# Patient Record
Sex: FEMALE | Race: WHITE | NOT HISPANIC OR LATINO | Employment: OTHER | URBAN - NONMETROPOLITAN AREA
[De-identification: names, ages, dates, MRNs, and addresses within clinical notes are randomized per-mention and may not be internally consistent; named-entity substitution may affect disease eponyms.]

---

## 2017-03-10 ENCOUNTER — TELEPHONE (OUTPATIENT)
Dept: FAMILY MEDICINE | Facility: OTHER | Age: 62
End: 2017-03-10

## 2017-03-10 NOTE — TELEPHONE ENCOUNTER
Panel Management Review      Patient has the following on her problem list: None      Composite cancer screening  Chart review shows that this patient is due/due soon for the following Mammogram  Summary:    Patient is due/failing the following:   MAMMOGRAM    Action needed:   Patient needs office visit for mammogram.    Type of outreach:    Sent Cerevo message.    Questions for provider review:    None                                                                                                                                    Brigette ACUNA       Chart routed to Care Team .

## 2017-05-19 ENCOUNTER — HOSPITAL ENCOUNTER (OUTPATIENT)
Dept: MAMMOGRAPHY | Facility: CLINIC | Age: 62
Discharge: HOME | End: 2017-05-19
Attending: INTERNAL MEDICINE | Admitting: INTERNAL MEDICINE
Payer: COMMERCIAL

## 2017-05-19 DIAGNOSIS — Z12.31 VISIT FOR SCREENING MAMMOGRAM: ICD-10-CM

## 2017-05-19 PROCEDURE — G0202 SCR MAMMO BI INCL CAD: HCPCS

## 2017-05-22 NOTE — PROGRESS NOTES
Dear Sally, your recent test results are attached.  Your mammogram was normal.    Feel free to contact me via the office or My Chart if you have any questions regarding the above.  Sincerely,  Hugo Carroll DO FACOI

## 2017-07-12 DIAGNOSIS — G25.81 RESTLESS LEG SYNDROME: ICD-10-CM

## 2017-07-12 NOTE — TELEPHONE ENCOUNTER
carbidopa-levodopa (SINEMET CR)  MG per tablet     Last Written Prescription Date: 8/11/16  Last Fill Quantity: 90, # refills: 3  Last Office Visit with [facility]NAILA, [facility] or [facility] prescribing provider: 6/14/16        BP Readings from Last 3 Encounters:   06/14/16 128/80   09/11/15 140/82   06/24/15 136/82

## 2017-07-14 RX ORDER — CARBIDOPA AND LEVODOPA 25; 100 MG/1; MG/1
TABLET, EXTENDED RELEASE ORAL
Qty: 30 TABLET | Refills: 0 | Status: SHIPPED | OUTPATIENT
Start: 2017-07-14 | End: 2017-07-25

## 2017-07-14 NOTE — TELEPHONE ENCOUNTER
Sinemet  Medication is being filled for 1 time refill only due to:  Patient needs to be seen because it has been more than one year since last visit.--routing to schedulers    Tu Waters RN, BSN

## 2017-07-25 ENCOUNTER — OFFICE VISIT (OUTPATIENT)
Dept: FAMILY MEDICINE | Facility: OTHER | Age: 62
End: 2017-07-25
Payer: COMMERCIAL

## 2017-07-25 VITALS
RESPIRATION RATE: 16 BRPM | HEART RATE: 88 BPM | BODY MASS INDEX: 20.73 KG/M2 | SYSTOLIC BLOOD PRESSURE: 120 MMHG | TEMPERATURE: 98 F | WEIGHT: 117 LBS | HEIGHT: 63 IN | DIASTOLIC BLOOD PRESSURE: 78 MMHG

## 2017-07-25 DIAGNOSIS — G25.81 RESTLESS LEG SYNDROME: ICD-10-CM

## 2017-07-25 PROCEDURE — 99213 OFFICE O/P EST LOW 20 MIN: CPT | Performed by: INTERNAL MEDICINE

## 2017-07-25 RX ORDER — CARBIDOPA AND LEVODOPA 25; 100 MG/1; MG/1
TABLET, EXTENDED RELEASE ORAL
Qty: 90 TABLET | Refills: 1 | Status: SHIPPED | OUTPATIENT
Start: 2017-07-25 | End: 2018-02-02

## 2017-07-25 ASSESSMENT — PAIN SCALES - GENERAL: PAINLEVEL: NO PAIN (0)

## 2017-07-25 NOTE — MR AVS SNAPSHOT
"              After Visit Summary   7/25/2017    Sally Ware    MRN: [id number]           Patient Information     Date Of Birth          1955        Visit Information        Provider Department      7/25/2017 9:40 AM Hugo Carroll, DO [facility]        Today's Diagnoses     Restless leg syndrome           Follow-ups after your visit        Who to contact     If you have questions or need follow up information about today's clinic visit or your schedule please contact [facility] directly at [phone].  Normal or non-critical lab and imaging results will be communicated to you by Tintrihart, letter or phone within 4 business days after the clinic has received the results. If you do not hear from us within 7 days, please contact the clinic through HealthTeacher / GoNoodlet or phone. If you have a critical or abnormal lab result, we will notify you by phone as soon as possible.  Submit refill requests through "[employer]" or call your pharmacy and they will forward the refill request to us. Please allow 3 business days for your refill to be completed.          Additional Information About Your Visit        MyChart Information     "[employer]" gives you secure access to your electronic health record. If you see a primary care provider, you can also send messages to your care team and make appointments. If you have questions, please call your primary care clinic.  If you do not have a primary care provider, please call [phone] and they will assist you.        Care EveryWhere ID     This is your Care EveryWhere ID. This could be used by other organizations to access your [place] medical records  [id number]        Your Vitals Were     Pulse Temperature Respirations Height BMI (Body Mass Index)       88 98  F (36.7  C) (Temporal) 16 5' 3\" (1.6 m) 20.73 kg/m2        Blood Pressure from Last 3 Encounters:   07/25/17 120/78   06/14/16 128/80   09/11/15 140/82    Weight from Last 3 Encounters: "   07/25/17 117 lb (53.1 kg)   06/14/16 117 lb 12.8 oz (53.4 kg)   09/11/15 118 lb (53.5 kg)              Today, you had the following     No orders found for display         Today's Medication Changes          These changes are accurate as of: 7/25/17  7:24 PM.  If you have any questions, ask your nurse or doctor.               These medicines have changed or have updated prescriptions.        Dose/Directions    carbidopa-levodopa  MG per CR tablet   Commonly known as:  SINEMET CR   This may have changed:  See the new instructions.   Used for:  Restless leg syndrome   Changed by:  Hugo Carroll, DO        TAKE ONE TABLET BY MOUTH EVERY NIGHT AT BEDTIME   Quantity:  90 tablet   Refills:  1            Where to get your medicines      These medications were sent to [facility] [address]   [address] [place] [address] [facility] [address]     Phone:  [phone]     carbidopa-levodopa  MG per CR tablet                Primary Care Provider Office Phone # Fax #    Hugo Carroll [phone]       [address]   [facility] [address]        Equal Access to Services     GEORGE CLARK AH: Hadii amparo ba hadasho Soomaali, waaxda luqadaha, qaybta kaalmada adeegyada, dhara marroquin. So [facility] [phone].    ATENCIÓN: Si habla español, tiene a richardson disposición servicios gratuitos de asistencia lingüística. [facility] [phone].    We comply with applicable federal civil rights laws and [place] laws. We do not discriminate on the basis of race, color, national origin, age, disability sex, sexual orientation or gender identity.            Thank you!     Thank you for choosing [facility]  for your care. Our goal is always to provide you with excellent care. Hearing back from our patients is one way we can continue to improve our services. Please take a few minutes to complete the written survey  that you may receive in the mail after your visit with us. Thank you!             Your Updated Medication List - Protect others around you: Learn how to safely use, store and throw away your medicines at www.disposemymeds.org.          This list is accurate as of: 7/25/17  7:24 PM.  Always use your most recent med list.                   Brand Name Dispense Instructions for use Diagnosis    augmented betamethasone dipropionate 0.05 % cream    DIPROLENE-AF    50 g    Apply sparingly to affected area twice daily as needed.  Do not apply to face.    Atopic dermatitis, unspecified type       carbidopa-levodopa  MG per CR tablet    SINEMET CR    90 tablet    TAKE ONE TABLET BY MOUTH EVERY NIGHT AT BEDTIME    Restless leg syndrome

## 2017-07-25 NOTE — NURSING NOTE
"Chief Complaint   Patient presents with     Restless Leg     Med check       Initial /78  Pulse 88  Temp 98  F (36.7  C) (Temporal)  Resp 16  Ht 5' 3\" (1.6 m)  Wt 117 lb (53.1 kg)  BMI 20.73 kg/m2 Estimated body mass index is 20.73 kg/(m^2) as calculated from the following:    Height as of this encounter: 5' 3\" (1.6 m).    Weight as of this encounter: 117 lb (53.1 kg).  Medication Reconciliation: complete   Migdalia Le CMA (AAMA)   "

## 2017-07-25 NOTE — PROGRESS NOTES
SUBJECTIVE:                                                    Sally Ware is a 61 year old female who presents to clinic today for the following health issues:      Medication Followup of Simemet CR    Taking Medication as prescribed: yes    Side Effects:  None    Medication Helping Symptoms:  yes                     Chief Complaint           The patient is a pleasant 61-year-old female who has a previous diagnosis of restless leg syndrome.  She was started on Sinemet and has had remarkable improvement.  She notes that at one point she was almost unable to drive the car as she cannot control her feet to use the pedals.  This has resolved.  On occasion she does take two at bedtime but this is generally a rarity.  She denies any symptoms or side effects other than some dry mouth.  She denies any specific extrapyramidal symptoms.                         PAST, FAMILY,SOCIAL HISTORY:     Medical  History:   has a past medical history of Restless legs syndrome.     Surgical History:   has a past surgical history that includes no history of surgery and Colonoscopy (N/A, 9/5/2014).     Social History:   reports that she has been smoking Cigarettes.  She has a 20.00 pack-year smoking history. She has never used smokeless tobacco. She reports that she drinks about 8.4 oz of alcohol per week  She reports that she does not use illicit drugs.     Family History:  family history includes Alzheimer Disease in her mother; Arthritis in her father and mother; C.A.D. in her father; CANCER in her maternal grandfather; HEART DISEASE in her father; Hypertension in her mother.            MEDICATIONS  Current Outpatient Prescriptions   Medication Sig Dispense Refill     carbidopa-levodopa (SINEMET CR)  MG per CR tablet TAKE ONE TABLET BY MOUTH EVERY NIGHT AT BEDTIME 90 tablet 1     [DISCONTINUED] carbidopa-levodopa (SINEMET CR)  MG per CR tablet TAKE ONE TABLET BY MOUTH EVERY NIGHT AT BEDTIME 30 tablet 0      augmented betamethasone dipropionate (DIPROLENE-AF) 0.05 % cream Apply sparingly to affected area twice daily as needed.  Do not apply to face. 50 g 0     [DISCONTINUED] carbidopa-levodopa (SINEMET CR)  MG per tablet TAKE ONE TABLET BY MOUTH EVERY NIGHT AT BEDTIME 90 tablet 3         --------------------------------------------------------------------------------------------------------------------                  Review of Systems     LUNGS: Pt denies: cough,excess sputum, hemoptysis, or shortness of breath.   HEART: Pt denies: chest pain, arrythmia, syncope, tachy or bradyarrhythmia.   GI: Pt denies: nausea, vomitting, diarrhea, constipation, melena, or hematochezia.   NEURO: Pt denies: seizures, strokes, diplopia, weakness, paraesthesias, or paralysis.   SKIN: Pt denies: itching, rashes, discoloration, or specific lesions of concern. Denies recent hair loss.                        Examination       Vital Signs:  B/P: 120/78, T: 98, P: 88, R: 16, BMI: Body mass index is 20.73 kg/(m^2).   Constitutional: The patient appears to be in no acute distress. The patient appears to be adequately hydrated. No acute respiratory or hemodynamic distress is noted at this time.   LUNGS: clear bilaterally, airflow is brisk, no intercostal retraction or stridor is noted. No coughing is noted during visit.   HEART:  regular without rubs, clicks, gallops, or murmurs. PMI is nondisplaced. Upstrokes are brisk. S1,S2 are heard.   GI: Abdomen is soft, without rebound, guarding or tenderness. Bowel sounds are appropriate. No renal bruits are heard.    NEURO: Pt is alert and appropriate. No neurologic lateralization is noted. Cranial nerves 2-12 are intact. Peripheral sensory and motor function are grossly normal.                        Decision-Making          1. Restless leg syndrome  Continue current medication  - carbidopa-levodopa (SINEMET CR)  MG per CR tablet; TAKE ONE TABLET BY MOUTH EVERY NIGHT AT BEDTIME  Dispense:  90 tablet; Refill: 1          I have carefully explained the diagnosis and treatment options with the patient. The patient has displayed an understanding of the above, and all subsequent questions were answered.             DO RAYMOND Stewart    Portions of this note were produced using ii4b  Although every attempt at real-time proof reading has been made, occasional grammar/syntax errors may have been missed.

## 2017-08-09 DIAGNOSIS — G25.81 RESTLESS LEG SYNDROME: ICD-10-CM

## 2017-08-09 NOTE — TELEPHONE ENCOUNTER
Sinemet     Last Written Prescription Date: 7/25/2017  Last Fill Quantity: 90, # refills: 1  Last Office Visit with [facility], [facility] or [facility] prescribing provider: 7/25/2017        BP Readings from Last 3 Encounters:   07/25/17 120/78   06/14/16 128/80   09/11/15 140/82

## 2017-08-11 RX ORDER — CARBIDOPA AND LEVODOPA 25; 100 MG/1; MG/1
1 TABLET, EXTENDED RELEASE ORAL AT BEDTIME
Qty: 30 TABLET | Refills: 0 | OUTPATIENT
Start: 2017-08-11

## 2017-10-11 ENCOUNTER — ALLIED HEALTH/NURSE VISIT (OUTPATIENT)
Dept: FAMILY MEDICINE | Facility: CLINIC | Age: 62
End: 2017-10-11
Payer: COMMERCIAL

## 2017-10-11 DIAGNOSIS — Z23 NEED FOR PROPHYLACTIC VACCINATION AND INOCULATION AGAINST INFLUENZA: Primary | ICD-10-CM

## 2017-10-11 PROCEDURE — 90471 IMMUNIZATION ADMIN: CPT

## 2017-10-11 PROCEDURE — 90686 IIV4 VACC NO PRSV 0.5 ML IM: CPT

## 2017-10-11 NOTE — PROGRESS NOTES
Injectable Influenza Immunization Documentation    1.  Is the person to be vaccinated sick today?   No    2. Does the person to be vaccinated have an allergy to a component   of the vaccine?   No    3. Has the person to be vaccinated ever had a serious reaction   to influenza vaccine in the past?   No    4. Has the person to be vaccinated ever had Guillain-Barré syndrome?   No    Form completed by Gracia Munoz CMA ([facility])

## 2017-10-11 NOTE — NURSING NOTE
Prior to injection verified patient identity using patient's name and date of birth.  Injection of influenza given by Gracia Munoz. Patient instructed to remain in clinic for 15 minutes afterwards, and to report any adverse reaction to me immediately.

## 2017-10-11 NOTE — MR AVS SNAPSHOT
After Visit Summary   10/11/2017    Sally Ware    MRN: [id number]           Patient Information     Date Of Birth          1955        Visit Information        Provider Department      10/11/2017 11:00 AM NL FLOAT TEAM D [facility]        Today's Diagnoses     Need for prophylactic vaccination and inoculation against influenza    -  1       Follow-ups after your visit        Who to contact     If you have questions or need follow up information about today's clinic visit or your schedule please contact [facility] directly at [phone].  Normal or non-critical lab and imaging results will be communicated to you by Car Clubshart, letter or phone within 4 business days after the clinic has received the results. If you do not hear from us within 7 days, please contact the clinic through Justice[employer] or phone. If you have a critical or abnormal lab result, we will notify you by phone as soon as possible.  Submit refill requests through [employer] or call your pharmacy and they will forward the refill request to us. Please allow 3 business days for your refill to be completed.          Additional Information About Your Visit        MyChart Information     [employer] gives you secure access to your electronic health record. If you see a primary care provider, you can also send messages to your care team and make appointments. If you have questions, please call your primary care clinic.  If you do not have a primary care provider, please call [phone] and they will assist you.        Care EveryWhere ID     This is your Care EveryWhere ID. This could be used by other organizations to access your [place] medical records  [id number]         Blood Pressure from Last 3 Encounters:   07/25/17 120/78   06/14/16 128/80   09/11/15 140/82    Weight from Last 3 Encounters:   07/25/17 117 lb (53.1 kg)   06/14/16 117 lb 12.8 oz (53.4 kg)   09/11/15 118 lb (53.5 kg)               We Performed the Following     FLU VAC, SPLIT VIRUS IM > 3 YO (QUADRIVALENT) [59213]     Vaccine Administration, Initial [95933]        Primary Care Provider Office Phone # Fax #    Hugo Carroll [phone] [address] [place] [address]        Equal Access to Services     ANGEL LUIS CLARK : Hadii aad ku hadasho Soomaali, waaxda luqadaha, qaybta kaalmada adeegyada, waxay idiin hayaan adelexx kharash lamirandan . So [facility] [phone].    ATENCIÓN: Si habla español, tiene a richardson disposición servicios gratuitos de asistencia lingüística. Katrinaame al [phone].    We comply with applicable federal civil rights laws and [place] laws. We do not discriminate on the basis of race, color, national origin, age, disability, sex, sexual orientation, or gender identity.            Thank you!     Thank you for choosing [facility]  for your care. Our goal is always to provide you with excellent care. Hearing back from our patients is one way we can continue to improve our services. Please take a few minutes to complete the written survey that you may receive in the mail after your visit with us. Thank you!             Your Updated Medication List - Protect others around you: Learn how to safely use, store and throw away your medicines at www.disposemymeds.org.          This list is accurate as of: 10/11/17 11:18 AM.  Always use your most recent med list.                   Brand Name Dispense Instructions for use Diagnosis    augmented betamethasone dipropionate 0.05 % cream    DIPROLENE-AF    50 g    Apply sparingly to affected area twice daily as needed.  Do not apply to face.    Atopic dermatitis, unspecified type       carbidopa-levodopa  MG per CR tablet    SINEMET CR    90 tablet    TAKE ONE TABLET BY MOUTH EVERY NIGHT AT BEDTIME    Restless leg syndrome

## 2017-12-03 ENCOUNTER — HEALTH MAINTENANCE LETTER (OUTPATIENT)
Age: 62
End: 2017-12-03

## 2018-02-02 DIAGNOSIS — G25.81 RESTLESS LEG SYNDROME: ICD-10-CM

## 2018-02-02 RX ORDER — CARBIDOPA AND LEVODOPA 25; 100 MG/1; MG/1
TABLET, EXTENDED RELEASE ORAL
Qty: 90 TABLET | Refills: 1 | Status: SHIPPED | OUTPATIENT
Start: 2018-02-02 | End: 2018-07-23

## 2018-02-02 NOTE — TELEPHONE ENCOUNTER
"Last Written Prescription Date:  7/25/2017  Last Fill Quantity: 90,  # refills: 1   Last Office Visit with [facility] provider:  7/25/2017   Future Office Visit:     Requested Prescriptions   Pending Prescriptions Disp Refills     carbidopa-levodopa (SINEMET CR)  MG per CR tablet [Pharmacy Med Name: CARBIDOPA-LEVODOPA ER  TBCR] 90 tablet 1     Sig: TAKE ONE TABLET BY MOUTH EVERY NIGHT AT BEDTIME    Antiparkinson's Agents Protocol Failed    2/2/2018 10:25 AM       Failed - CBC on record in past 12 months    Recent Labs   Lab Test  06/12/15   1510   WBC  7.5   RBC  4.37   HGB  14.2   HCT  42.8   PLT  255            Failed - ALT on record in past 12 months        Recent Labs   Lab Test  06/12/15   1510   ALT  16            Failed - Serum Creatinine on file in past 12 months    Recent Labs   Lab Test  06/12/15   1510   CR  0.58            Passed - Blood pressure under 140/90    BP Readings from Last 3 Encounters:   07/25/17 120/78   06/14/16 128/80   09/11/15 140/82                Passed - Recent or future visit with authorizing provider's specialty    Patient had office visit in the last year or has a visit in the next 30 days with authorizing provider.  See \"Patient Info\" tab in inbasket, or \"Choose Columns\" in Meds & Orders section of the refill encounter.            Passed - Patient is age 18 or older       Passed - No active pregnancy on record       Passed - No positive pregnancy test in the past 12 months          "

## 2018-02-02 NOTE — TELEPHONE ENCOUNTER
Routing refill request to provider for review/approval because:  Labs not current:  CBC, ALT, Creatinine    Suyapa Vásquez, RN  [facility]

## 2018-03-26 ENCOUNTER — OFFICE VISIT (OUTPATIENT)
Dept: FAMILY MEDICINE | Facility: OTHER | Age: 63
End: 2018-03-26
Payer: COMMERCIAL

## 2018-03-26 VITALS
OXYGEN SATURATION: 98 % | DIASTOLIC BLOOD PRESSURE: 88 MMHG | HEIGHT: 63 IN | HEART RATE: 80 BPM | WEIGHT: 105.8 LBS | SYSTOLIC BLOOD PRESSURE: 162 MMHG | TEMPERATURE: 97.5 F | BODY MASS INDEX: 18.75 KG/M2

## 2018-03-26 DIAGNOSIS — L98.9 SKIN LESION: Primary | ICD-10-CM

## 2018-03-26 PROCEDURE — 99213 OFFICE O/P EST LOW 20 MIN: CPT | Performed by: INTERNAL MEDICINE

## 2018-03-26 NOTE — PROGRESS NOTES
Chief Complaint   Patient presents with     Derm Problem     dark spot on right cheek                    Chief Complaint    The patient is a pleasant 62-year-old female who recently returned from [place].  She has a lesion on her right cheek that was not there about 2 months ago.  She does have a long-standing history of a small brown spot on the same area and this is consistent with a seborrheic keratosis.  At the inferior aspect of this however is a growing, dark, irregularly shaped 1 cm lesion which is somewhat raised.  He does have characteristics consistent with melanoma.  We have discussed this in detail and the patient would like to have it removed in a prompt fashion.  At this time, we decided that based on its location on her face and the potential for wider margins be necessary that I would request general surgery involvement in this.  We will also set up dermatology referral but unfortunately that is generally several months out.                         PAST, FAMILY,SOCIAL HISTORY:     Medical  History:   has a past medical history of Restless legs syndrome.     Surgical History:   has a past surgical history that includes no history of surgery and Colonoscopy (N/A, 9/5/2014).     Social History:   reports that she has been smoking Cigarettes.  She has a 20.00 pack-year smoking history. She has never used smokeless tobacco. She reports that she drinks about 8.4 oz of alcohol per week  She reports that she does not use illicit drugs.     Family History:  family history includes Alzheimer Disease in her mother; Arthritis in her father and mother; C.A.D. in her father; CANCER in her maternal grandfather; HEART DISEASE in her father; Hypertension in her mother.            MEDICATIONS  Current Outpatient Prescriptions   Medication Sig Dispense Refill     carbidopa-levodopa (SINEMET CR)  MG per CR tablet TAKE ONE TABLET BY MOUTH EVERY NIGHT AT BEDTIME 90 tablet 1     augmented betamethasone dipropionate  "(DIPROLENE-AF) 0.05 % cream Apply sparingly to affected area twice daily as needed.  Do not apply to face. 50 g 0         --------------------------------------------------------------------------------------------------------------------                              Review of Systems     LUNGS: Pt denies: cough,excess sputum, hemoptysis, or shortness of breath.   HEART: Pt denies: chest pain, arrythmia, syncope, tachy or bradyarrhythmia.   GI: Pt denies: nausea, vomitting, diarrhea, constipation, melena, or hematochezia.   NEURO: Pt denies: seizures, strokes, diplopia, weakness, paraesthesias, or paralysis.   SKIN: Pt denies: itching, rashes, discoloration, or additional lesions of concern. Denies recent hair loss.                                       Examination  /88 (BP Location: Left arm, Patient Position: Chair, Cuff Size: Adult Regular)  Pulse 80  Temp 97.5  F (36.4  C) (Tympanic)  Ht 5' 2.5\" (1.588 m)  Wt 105 lb 12.8 oz (48 kg)  SpO2 98%  BMI 19.04 kg/m2   HEART:  regular without rubs, clicks, gallops, or murmurs. PMI is nondisplaced. Upstrokes are brisk. S1,S2 are heard.   LUNGS: clear bilaterally, airflow is brisk, no intercostal retraction or stridor is noted. No coughing is noted during visit.   GI: Abdomen is soft, without rebound, guarding or tenderness. Bowel sounds are appropriate. No renal bruits are heard.   SKIN:  warm and dry. No erythema, or rashes are noted. No additional lesions of concern are noted.                                           Decision Making    1. Skin lesion    - GENERAL SURG ADULT REFERRAL  - DERMATOLOGY REFERRAL   Will set of general surgical referral for evaluation and possible removal of the initial lesion and dermatology consultation for follow-up                               FOLLOW UP    I have asked the patient to make an appointment for follow up with me as needed          Hugo Carroll, DO RAYMOND    Portions of this note were produced using Dragon " [employer] 360  Although every attempt at real-time proof reading has been made, occasional grammar/syntax errors may have been missed.

## 2018-03-26 NOTE — MR AVS SNAPSHOT
After Visit Summary   3/26/2018    Sally Ware    MRN: [id number]           Patient Information     Date Of Birth          1955        Visit Information        Provider Department      3/26/2018 1:00 PM Hugo Carroll,  [facility]         Follow-ups after your visit        Your next 10 appointments already scheduled     Mar 29, 2018  2:15 PM CDT   [facility] Visit with Jose Padilla,    [facility]    [address] [facility] [address] [phone]              Who to contact     If you have questions or need follow up information about today's clinic visit or your schedule please contact [facility] directly at [phone].  Normal or non-critical lab and imaging results will be communicated to you by MyChart, letter or phone within 4 business days after the clinic has received the results. If you do not hear from us within 7 days, please contact the clinic through MyChart or phone. If you have a critical or abnormal lab result, we will notify you by phone as soon as possible.  Submit refill requests through [employer] or call your pharmacy and they will forward the refill request to us. Please allow 3 business days for your refill to be completed.          Additional Information About Your Visit        MyChart Information     [employer] gives you secure access to your electronic health record. If you see a primary care provider, you can also send messages to your care team and make appointments. If you have questions, please call your primary care clinic.  If you do not have a primary care provider, please call [phone] and they will assist you.        Care EveryWhere ID     This is your Care EveryWhere ID. This could be used by other organizations to access your [place] medical records  [id number]        Your Vitals Were     Pulse Temperature Height Pulse Oximetry BMI (Body Mass Index)       80  "97.5  F (36.4  C) (Tympanic) 5' 2.5\" (1.588 m) 98% 19.04 kg/m2        Blood Pressure from Last 3 Encounters:   03/26/18 162/88   07/25/17 120/78   06/14/16 128/80    Weight from Last 3 Encounters:   03/26/18 105 lb 12.8 oz (48 kg)   07/25/17 117 lb (53.1 kg)   06/14/16 117 lb 12.8 oz (53.4 kg)              Today, you had the following     No orders found for display       Primary Care Provider Office Phone # Fax #    Hugo [place], [phone] [address] [place] [address]        Equal Access to Services     ANGEL LUIS CLARK : Kellie walsho Soomaali, waaxda luqadaha, qaybta kaalmada adeegyada, dhara davis . [facility] [phone].    ATENCIÓN: Si habla español, tiene a richardson disposición servicios gratuitos de asistencia lingüística. Llame al [phone].    We comply with applicable federal civil rights laws and [place] laws. We do not discriminate on the basis of race, color, national origin, age, disability, sex, sexual orientation, or gender identity.            Thank you!     Thank you for choosing [facility]  for your care. Our goal is always to provide you with excellent care. Hearing back from our patients is one way we can continue to improve our services. Please take a few minutes to complete the written survey that you may receive in the mail after your visit with us. Thank you!             Your Updated Medication List - Protect others around you: Learn how to safely use, store and throw away your medicines at www.disposemymeds.org.          This list is accurate as of 3/26/18  1:26 PM.  Always use your most recent med list.                   Brand Name Dispense Instructions for use Diagnosis    augmented betamethasone dipropionate 0.05 % cream    DIPROLENE-AF    50 g    Apply sparingly to affected area twice daily as needed.  Do not apply to face.    Atopic dermatitis, unspecified type       carbidopa-levodopa  MG " per CR tablet    SINEMET CR    90 tablet    TAKE ONE TABLET BY MOUTH EVERY NIGHT AT BEDTIME    Restless leg syndrome

## 2018-03-26 NOTE — NURSING NOTE
"Chief Complaint   Patient presents with     Derm Problem     dark spot on right cheek       Initial /88 (BP Location: Left arm, Patient Position: Chair, Cuff Size: Adult Regular)  Pulse 80  Temp 97.5  F (36.4  C) (Tympanic)  Ht 5' 2.5\" (1.588 m)  Wt 105 lb 12.8 oz (48 kg)  SpO2 98%  BMI 19.04 kg/m2 Estimated body mass index is 19.04 kg/(m^2) as calculated from the following:    Height as of this encounter: 5' 2.5\" (1.588 m).    Weight as of this encounter: 105 lb 12.8 oz (48 kg).  Medication Reconciliation: complete  Health Maintenance reviewed at today's visit patient asked to schedule/complete:   Cervical Cancer:  Patient agrees to schedule   Brigette ACUNA      "

## 2018-03-29 ENCOUNTER — OFFICE VISIT (OUTPATIENT)
Dept: SURGERY | Facility: OTHER | Age: 63
End: 2018-03-29
Payer: COMMERCIAL

## 2018-03-29 VITALS
SYSTOLIC BLOOD PRESSURE: 126 MMHG | HEIGHT: 63 IN | WEIGHT: 107 LBS | DIASTOLIC BLOOD PRESSURE: 72 MMHG | BODY MASS INDEX: 18.96 KG/M2 | TEMPERATURE: 98.4 F

## 2018-03-29 DIAGNOSIS — L98.9 SKIN LESION OF FACE: Primary | ICD-10-CM

## 2018-03-29 PROCEDURE — 88305 TISSUE EXAM BY PATHOLOGIST: CPT | Performed by: SURGERY

## 2018-03-29 PROCEDURE — 11100 ZZHC BIOPSY SKIN/SUBQ/MUC MEM, SINGLE LESION: CPT | Performed by: SURGERY

## 2018-03-29 PROCEDURE — 99203 OFFICE O/P NEW LOW 30 MIN: CPT | Mod: 25 | Performed by: SURGERY

## 2018-03-29 NOTE — LETTER
3/29/2018         RE: Sally Ware  [address] [facility] [address]        Dear Colleague,    Thank you for referring your patient, Sally Ware, to the [facility]. Please see a copy of my visit note below.    Patient seen in consultation for skin lesion of face by Hugo Carroll    HPI:  Patient is a 62 year old female with 2 month history of Right cheek lesion. She states that she used to work outside and does a lot of fishing so has history of sun exposure. She denies personal history of skin lesions. He father had several skin cancers including melanoma. She states that it itches at times. It has not drained.    Review Of Systems    Skin: as above  Ears/Nose/Throat: negative  Respiratory: No shortness of breath, dyspnea on exertion, cough, or hemoptysis  Cardiovascular: negative  Gastrointestinal: negative  Genitourinary: negative  Musculoskeletal: negative  Neurologic: negative  Hematologic/Lymphatic/Immunologic: negative  Endocrine: negative      Past Medical History:   Diagnosis Date     Restless legs syndrome        Past Surgical History:   Procedure Laterality Date     COLONOSCOPY N/A 9/5/2014    Procedure: COMBINED COLONOSCOPY, SINGLE BIOPSY/POLYPECTOMY BY BIOPSY;  Surgeon: Turner Freitas MD;  Location: [facility]     NO HISTORY OF SURGERY         Family History   Problem Relation Age of Onset     Alzheimer Disease Mother      dementia     Arthritis Mother      Hypertension Mother      C.A.D. Father      HEART DISEASE Father      Arthritis Father      RA     CANCER Maternal Grandfather      Brain Tumor       Social History     Social History     Marital status: Single     Spouse name: N/A     Number of children: N/A     Years of education: N/A     Occupational History     Not on file.     Social History Main Topics     Smoking status: Current Every Day Smoker     Packs/day: 0.50     Years: 40.00     Types: Cigarettes     Smokeless tobacco: Never Used     Alcohol  "use 8.4 oz/week     14 Standard drinks or equivalent per week      Comment: 2 drinks per day     Drug use: No     Sexual activity: Yes     Partners: Male     Other Topics Concern     Not on file     Social History Narrative       Current Outpatient Prescriptions   Medication Sig Dispense Refill     carbidopa-levodopa (SINEMET CR)  MG per CR tablet TAKE ONE TABLET BY MOUTH EVERY NIGHT AT BEDTIME 90 tablet 1     augmented betamethasone dipropionate (DIPROLENE-AF) 0.05 % cream Apply sparingly to affected area twice daily as needed.  Do not apply to face. 50 g 0       Medications and history reviewed    Physical exam:  Vitals: /72  Temp 98.4  F (36.9  C) (Temporal)  Ht 1.588 m (5' 2.5\")  Wt 48.5 kg (107 lb)  BMI 19.26 kg/m2  BMI= Body mass index is 19.26 kg/(m^2).    Constitutional: Healthy, alert, non-distressed   Head: Normo-cephalic, atraumatic, no lesions, masses or tenderness   Cardiovascular: RRR, no new murmurs, +S1, +S2 heart sounds, no clicks, rubs or gallops   Respiratory: CTAB, no rales, rhonchi or wheezing, equal chest rise, good respiratory effort   Gastrointestinal: Soft, non-tender, non distended, no rebound rigidity or guarding, no masses or hernias palpated   : Deferred  Musculoskeletal: Moves all extremities, normal  strength, no deformities noted   Skin: Right cheek with raised, textured pigmented lesion adjacent to lighter pigmented flat area. Size of larger raised area ~7mm diameter  Psychiatric: Mentation appears normal, affect appropriate   Hematologic/Lymphatic/Immunologic: Normal cervical and supraclavicular lymph nodes   Patient able to get up on table without difficulty.    Labs show:  No results found for this or any previous visit (from the past 24 hour(s)).    I    Assessment:     ICD-10-CM    1. Skin lesion of face L98.9      Plan: Punch biopsy of skin lesion of right cheek. If malignant will refer to dermatology, if benign I will excise remainder of lesion in office " with minimal margins. See below for procedure.    Jose Padilla DO     PROCEDURE:   Written consent was obtained    The right cheek area was prepped and appropriately anesthetized with 1% lidocaine with epinephrine. Using the usual technique, 4mm punch biopsy was performed. The total area excised, including lesion and margins was 0.4 cm.  Closure was accomplished with  subcuticular 4-0 monocryl for the skin. Total length of the incision after closure was 0.5 cm. An appropriate  dressing was applied.  The procedure was well tolerated and without complications. Specimen was sent to Pathology.          Again, thank you for allowing me to participate in the care of your patient.        Sincerely,        Joes Padilla DO

## 2018-03-29 NOTE — MR AVS SNAPSHOT
After Visit Summary   3/29/2018    Sally Ware    MRN: [id number]           Patient Information     Date Of Birth          1955        Visit Information        Provider Department      3/29/2018 2:15 PM Jose Padilla DO [facility]        Today's Diagnoses     Skin lesion of face    -  1       Follow-ups after your visit        Your next 10 appointments already scheduled     Apr 05, 2018  1:30 PM CDT   [facility] Visit with Jose Padilla DO   [facility]    [address] [facility] [address] [phone]              Who to contact     If you have questions or need follow up information about today's clinic visit or your schedule please contact [facility] directly at [phone].  Normal or non-critical lab and imaging results will be communicated to you by MyChart, letter or phone within 4 business days after the clinic has received the results. If you do not hear from us within 7 days, please contact the clinic through MyChart or phone. If you have a critical or abnormal lab result, we will notify you by phone as soon as possible.  Submit refill requests through [employer] or call your pharmacy and they will forward the refill request to us. Please allow 3 business days for your refill to be completed.          Additional Information About Your Visit        MyChart Information     [employer] gives you secure access to your electronic health record. If you see a primary care provider, you can also send messages to your care team and make appointments. If you have questions, please call your primary care clinic.  If you do not have a primary care provider, please call [phone] and they will assist you.        Care EveryWhere ID     This is your Care EveryWhere ID. This could be used by other organizations to access your [place] medical records  [id number]        Your Vitals Were     Temperature  "Height BMI (Body Mass Index)             98.4  F (36.9  C) (Temporal) 1.588 m (5' 2.5\") 19.26 kg/m2          Blood Pressure from Last 3 Encounters:   03/29/18 126/72   03/26/18 162/88   07/25/17 120/78    Weight from Last 3 Encounters:   03/29/18 48.5 kg (107 lb)   03/26/18 48 kg (105 lb 12.8 oz)   07/25/17 53.1 kg (117 lb)              We Performed the Following     Surgical pathology exam        Primary Care Provider Office Phone # Fax #    Hugo Carroll, [phone] [address] [place] [address]        Equal Access to Services     GEORGE CLARK : Hadii aad ku hadasho Sonikunjali, waaxda luqadaha, qaybta kaalmada adeegyada, waxay emmanuel marroquin. So [facility] [phone].    ATENCIÓN: Si habla español, tiene a richardson disposición servicios gratuitos de asistencia lingüística. LlGood [facility] [phone].    We comply with applicable federal civil rights laws and [place] laws. We do not discriminate on the basis of race, color, national origin, age, disability, sex, sexual orientation, or gender identity.            Thank you!     Thank you for choosing [facility]  for your care. Our goal is always to provide you with excellent care. Hearing back from our patients is one way we can continue to improve our services. Please take a few minutes to complete the written survey that you may receive in the mail after your visit with us. Thank you!             Your Updated Medication List - Protect others around you: Learn how to safely use, store and throw away your medicines at www.disposemymeds.org.          This list is accurate as of 3/29/18  4:29 PM.  Always use your most recent med list.                   Brand Name Dispense Instructions for use Diagnosis    augmented betamethasone dipropionate 0.05 % cream    DIPROLENE-AF    50 g    Apply sparingly to affected area twice daily as needed.  Do not apply to face.    Atopic dermatitis, unspecified type       " carbidopa-levodopa  MG per CR tablet    SINEMET CR    90 tablet    TAKE ONE TABLET BY MOUTH EVERY NIGHT AT BEDTIME    Restless leg syndrome

## 2018-03-29 NOTE — PROGRESS NOTES
Patient seen in consultation for skin lesion of face by Hugo Carroll    HPI:  Patient is a 62 year old female with 2 month history of Right cheek lesion. She states that she used to work outside and does a lot of fishing so has history of sun exposure. She denies personal history of skin lesions. He father had several skin cancers including melanoma. She states that it itches at times. It has not drained.    Review Of Systems    Skin: as above  Ears/Nose/Throat: negative  Respiratory: No shortness of breath, dyspnea on exertion, cough, or hemoptysis  Cardiovascular: negative  Gastrointestinal: negative  Genitourinary: negative  Musculoskeletal: negative  Neurologic: negative  Hematologic/Lymphatic/Immunologic: negative  Endocrine: negative      Past Medical History:   Diagnosis Date     Restless legs syndrome        Past Surgical History:   Procedure Laterality Date     COLONOSCOPY N/A 9/5/2014    Procedure: COMBINED COLONOSCOPY, SINGLE BIOPSY/POLYPECTOMY BY BIOPSY;  Surgeon: Turner Freitas MD;  Location: [facility]     NO HISTORY OF SURGERY         Family History   Problem Relation Age of Onset     Alzheimer Disease Mother      dementia     Arthritis Mother      Hypertension Mother      C.A.D. Father      HEART DISEASE Father      Arthritis Father      RA     CANCER Maternal Grandfather      Brain Tumor       Social History     Social History     Marital status: Single     Spouse name: N/A     Number of children: N/A     Years of education: N/A     Occupational History     Not on file.     Social History Main Topics     Smoking status: Current Every Day Smoker     Packs/day: 0.50     Years: 40.00     Types: Cigarettes     Smokeless tobacco: Never Used     Alcohol use 8.4 oz/week     14 Standard drinks or equivalent per week      Comment: 2 drinks per day     Drug use: No     Sexual activity: Yes     Partners: Male     Other Topics Concern     Not on file     Social History Narrative       Current  "Outpatient Prescriptions   Medication Sig Dispense Refill     carbidopa-levodopa (SINEMET CR)  MG per CR tablet TAKE ONE TABLET BY MOUTH EVERY NIGHT AT BEDTIME 90 tablet 1     augmented betamethasone dipropionate (DIPROLENE-AF) 0.05 % cream Apply sparingly to affected area twice daily as needed.  Do not apply to face. 50 g 0       Medications and history reviewed    Physical exam:  Vitals: /72  Temp 98.4  F (36.9  C) (Temporal)  Ht 1.588 m (5' 2.5\")  Wt 48.5 kg (107 lb)  BMI 19.26 kg/m2  BMI= Body mass index is 19.26 kg/(m^2).    Constitutional: Healthy, alert, non-distressed   Head: Normo-cephalic, atraumatic, no lesions, masses or tenderness   Cardiovascular: RRR, no new murmurs, +S1, +S2 heart sounds, no clicks, rubs or gallops   Respiratory: CTAB, no rales, rhonchi or wheezing, equal chest rise, good respiratory effort   Gastrointestinal: Soft, non-tender, non distended, no rebound rigidity or guarding, no masses or hernias palpated   : Deferred  Musculoskeletal: Moves all extremities, normal  strength, no deformities noted   Skin: Right cheek with raised, textured pigmented lesion adjacent to lighter pigmented flat area. Size of larger raised area ~7mm diameter  Psychiatric: Mentation appears normal, affect appropriate   Hematologic/Lymphatic/Immunologic: Normal cervical and supraclavicular lymph nodes   Patient able to get up on table without difficulty.    Labs show:  No results found for this or any previous visit (from the past 24 hour(s)).    I    Assessment:     ICD-10-CM    1. Skin lesion of face L98.9      Plan: Punch biopsy of skin lesion of right cheek. If malignant will refer to dermatology, if benign I will excise remainder of lesion in office with minimal margins. See below for procedure.    Jose Padilla,      PROCEDURE:   Written consent was obtained    The right cheek area was prepped and appropriately anesthetized with 1% lidocaine with epinephrine. Using the " usual technique, 4mm punch biopsy was performed. The total area excised, including lesion and margins was 0.4 cm.  Closure was accomplished with  subcuticular 4-0 monocryl for the skin. Total length of the incision after closure was 0.5 cm. An appropriate  dressing was applied.  The procedure was well tolerated and without complications. Specimen was sent to Pathology.

## 2018-03-29 NOTE — NURSING NOTE
"Chief Complaint   Patient presents with     Consult     dark spot, right face       Initial /72  Temp 98.4  F (36.9  C) (Temporal)  Ht 1.588 m (5' 2.5\")  Wt 48.5 kg (107 lb)  BMI 19.26 kg/m2 Estimated body mass index is 19.26 kg/(m^2) as calculated from the following:    Height as of this encounter: 1.588 m (5' 2.5\").    Weight as of this encounter: 48.5 kg (107 lb).  Medication Reconciliation: complete   Curry ODONNELL CMA    "

## 2018-04-02 LAB — COPATH REPORT: NORMAL

## 2018-04-05 ENCOUNTER — OFFICE VISIT (OUTPATIENT)
Dept: SURGERY | Facility: OTHER | Age: 63
End: 2018-04-05
Payer: COMMERCIAL

## 2018-04-05 VITALS
BODY MASS INDEX: 18.78 KG/M2 | HEIGHT: 63 IN | TEMPERATURE: 97.9 F | SYSTOLIC BLOOD PRESSURE: 132 MMHG | DIASTOLIC BLOOD PRESSURE: 84 MMHG | WEIGHT: 106 LBS

## 2018-04-05 DIAGNOSIS — L98.9 BENIGN SKIN LESION OF FACE: Primary | ICD-10-CM

## 2018-04-05 PROCEDURE — 11441 EXC FACE-MM B9+MARG 0.6-1 CM: CPT | Performed by: SURGERY

## 2018-04-05 PROCEDURE — 12051 INTMD RPR FACE/MM 2.5 CM/<: CPT | Performed by: SURGERY

## 2018-04-05 NOTE — MR AVS SNAPSHOT
"              After Visit Summary   4/5/2018    Sally Ware    MRN: [id number]           Patient Information     Date Of Birth          1955        Visit Information        Provider Department      4/5/2018 1:30 PM Jose Padilla,  [facility]        Today's Diagnoses     Benign skin lesion of face    -  1       Follow-ups after your visit        Who to contact     If you have questions or need follow up information about today's clinic visit or your schedule please contact [facility] directly at [phone].  Normal or non-critical lab and imaging results will be communicated to you by [employer], letter or phone within 4 business days after the clinic has received the results. If you do not hear from us within 7 days, please contact the clinic through [employer] or phone. If you have a critical or abnormal lab result, we will notify you by phone as soon as possible.  Submit refill requests through [employer] or call your pharmacy and they will forward the refill request to us. Please allow 3 business days for your refill to be completed.          Additional Information About Your Visit        MyChart Information     [employer] gives you secure access to your electronic health record. If you see a primary care provider, you can also send messages to your care team and make appointments. If you have questions, please call your primary care clinic.  If you do not have a primary care provider, please call [phone] and they will assist you.        Care EveryWhere ID     This is your Care EveryWhere ID. This could be used by other organizations to access your [place] medical records  [id number]        Your Vitals Were     Temperature Height BMI (Body Mass Index)             97.9  F (36.6  C) (Temporal) 1.6 m (5' 3\") 18.78 kg/m2          Blood Pressure from Last 3 Encounters:   04/05/18 132/84   03/29/18 126/72   03/26/18 162/88    Weight from Last 3 Encounters:   04/05/18 " 48.1 kg (106 lb)   03/29/18 48.5 kg (107 lb)   03/26/18 48 kg (105 lb 12.8 oz)              Today, you had the following     No orders found for display       Primary Care Provider Office Phone # Fax #    Hugo Carroll [phone] [address] [place] [address]        Equal Access to Services     [facility] CenterERWIN : Hadii aad ku hadasho Soomaali, waaxda luqadaha, qaybta kaalmada adeegyada, waxay idiin hayaan adeeg kharash la'aan ah. So [facility] [phone].    ATENCIÓN: Si habla mily, tiene a richardson disposición servicios gratuitos de asistencia lingüística. Llame al [phone].    We comply with applicable federal civil rights laws and [place] laws. We do not discriminate on the basis of race, color, national origin, age, disability, sex, sexual orientation, or gender identity.            Thank you!     Thank you for choosing [facility]  for your care. Our goal is always to provide you with excellent care. Hearing back from our patients is one way we can continue to improve our services. Please take a few minutes to complete the written survey that you may receive in the mail after your visit with us. Thank you!             Your Updated Medication List - Protect others around you: Learn how to safely use, store and throw away your medicines at www.disposemymeds.org.          This list is accurate as of 4/5/18  4:47 PM.  Always use your most recent med list.                   Brand Name Dispense Instructions for use Diagnosis    augmented betamethasone dipropionate 0.05 % cream    DIPROLENE-AF    50 g    Apply sparingly to affected area twice daily as needed.  Do not apply to face.    Atopic dermatitis, unspecified type       carbidopa-levodopa  MG per CR tablet    SINEMET CR    90 tablet    TAKE ONE TABLET BY MOUTH EVERY NIGHT AT BEDTIME    Restless leg syndrome

## 2018-04-05 NOTE — LETTER
4/5/2018         RE: Sally Ware  [address]  [place] [address]        Dear Colleague,    Thank you for referring your patient, Sally Ware, to the [facility]. Please see a copy of my visit note below.    Punch biopsy revealed inflamed seborrhoic kerataosis, she would like the remainder of the pigmented lesion removed.      PROCEDURE:   Written consent was obtained    The right cheek area was prepped and appropriately anesthetized with 1% lidocaine with epinephrine. Using the usual technique, excision of benign lesion of the face was performed. The total area excised, including lesion and margins was 1.0cm x 0.3 cm.  Closure was accomplished with interrupted 3-0 vicryl for the dermal layer and running subcuticular 4-0 monocryl for the skin. Total length of the incision after closure was 1.0 cm. An appropriate  dressing was applied.  The procedure was well tolerated and without complications. Specimen was not sent to Pathology.        Again, thank you for allowing me to participate in the care of your patient.        Sincerely,        Jose Padilla, DO

## 2018-04-05 NOTE — NURSING NOTE
"Chief Complaint   Patient presents with     RECHECK     spot on face and results       Initial /84  Temp 97.9  F (36.6  C) (Temporal)  Ht 1.6 m (5' 3\")  Wt 48.1 kg (106 lb)  BMI 18.78 kg/m2 Estimated body mass index is 18.78 kg/(m^2) as calculated from the following:    Height as of this encounter: 1.6 m (5' 3\").    Weight as of this encounter: 48.1 kg (106 lb).  Medication Reconciliation: complete   Curry ODONNELL CMA    "

## 2018-04-05 NOTE — PROGRESS NOTES
Punch biopsy revealed inflamed seborrhoic kerataosis, she would like the remainder of the pigmented lesion removed.      PROCEDURE:   Written consent was obtained    The right cheek area was prepped and appropriately anesthetized with 1% lidocaine with epinephrine. Using the usual technique, excision of benign lesion of the face was performed. The total area excised, including lesion and margins was 1.0cm x 0.3 cm.  Closure was accomplished with interrupted 3-0 vicryl for the dermal layer and running subcuticular 4-0 monocryl for the skin. Total length of the incision after closure was 1.0 cm. An appropriate  dressing was applied.  The procedure was well tolerated and without complications. Specimen was not sent to Pathology.

## 2018-07-09 ENCOUNTER — HOSPITAL ENCOUNTER (OUTPATIENT)
Dept: MAMMOGRAPHY | Facility: CLINIC | Age: 63
Discharge: HOME | End: 2018-07-09
Attending: INTERNAL MEDICINE | Admitting: INTERNAL MEDICINE
Payer: COMMERCIAL

## 2018-07-09 DIAGNOSIS — Z12.31 VISIT FOR SCREENING MAMMOGRAM: ICD-10-CM

## 2018-07-09 PROCEDURE — 77067 SCR MAMMO BI INCL CAD: CPT

## 2018-07-23 DIAGNOSIS — G25.81 RESTLESS LEG SYNDROME: ICD-10-CM

## 2018-07-23 NOTE — TELEPHONE ENCOUNTER
"Requested Prescriptions   Pending Prescriptions Disp Refills     carbidopa-levodopa (SINEMET CR)  MG per CR tablet [Pharmacy Med Name: CARBIDOPA-LEVODOPA ER  TBCR] 90 tablet 1    Last Written Prescription Date:  2/2/18  Last Fill Quantity: 90,  # refills: 1   Last office visit: 3/26/2018 with prescribing provider:  3/26/18   Future Office Visit:     Sig: TAKE ONE TABLET BY MOUTH EVERY NIGHT AT BEDTIME    Antiparkinson's Agents Protocol Passed    7/23/2018 10:29 AM       Passed - Recent (12 mo) or future (30 days) visit within the authorizing provider's specialty    Patient had office visit in the last 12 months or has a visit in the next 30 days with authorizing provider or within the authorizing provider's specialty.  See \"Patient Info\" tab in inbasket, or \"Choose Columns\" in Meds & Orders section of the refill encounter.           Passed - Patient is age 18 or older       Passed - No active pregnancy on record       Passed - No positive pregnancy test in the past 12 months          "

## 2018-07-24 RX ORDER — CARBIDOPA AND LEVODOPA 25; 100 MG/1; MG/1
TABLET, EXTENDED RELEASE ORAL
Qty: 90 TABLET | Refills: 1 | Status: SHIPPED | OUTPATIENT
Start: 2018-07-24 | End: 2019-01-20

## 2018-07-24 NOTE — TELEPHONE ENCOUNTER
Routing refill request to provider for review/approval because:  Has not been seen for this in over a year    Suyapa Vásquez RN  [facility]

## 2018-09-14 ENCOUNTER — OFFICE VISIT (OUTPATIENT)
Dept: FAMILY MEDICINE | Facility: OTHER | Age: 63
End: 2018-09-14
Payer: COMMERCIAL

## 2018-09-14 VITALS
TEMPERATURE: 97.5 F | RESPIRATION RATE: 24 BRPM | SYSTOLIC BLOOD PRESSURE: 152 MMHG | DIASTOLIC BLOOD PRESSURE: 88 MMHG | OXYGEN SATURATION: 97 % | BODY MASS INDEX: 18.19 KG/M2 | WEIGHT: 102.7 LBS | HEART RATE: 88 BPM

## 2018-09-14 DIAGNOSIS — H69.93 DYSFUNCTION OF BOTH EUSTACHIAN TUBES: Primary | ICD-10-CM

## 2018-09-14 PROCEDURE — 99213 OFFICE O/P EST LOW 20 MIN: CPT | Performed by: INTERNAL MEDICINE

## 2018-09-14 RX ORDER — CETIRIZINE HYDROCHLORIDE 10 MG/1
10 TABLET ORAL EVERY EVENING
Qty: 30 TABLET | Refills: 0 | Status: SHIPPED | OUTPATIENT
Start: 2018-09-14 | End: 2018-10-08

## 2018-09-14 RX ORDER — PREDNISONE 20 MG/1
40 TABLET ORAL DAILY
Qty: 10 TABLET | Refills: 0 | Status: SHIPPED | OUTPATIENT
Start: 2018-09-14 | End: 2018-09-19

## 2018-09-14 ASSESSMENT — PAIN SCALES - GENERAL: PAINLEVEL: NO PAIN (0)

## 2018-09-14 NOTE — MR AVS SNAPSHOT
After Visit Summary   9/14/2018    Sally Ware    MRN: [id number]           Patient Information     Date Of Birth          1955        Visit Information        Provider Department      9/14/2018 2:40 PM Hugo Carroll DO [facility]        Today's Diagnoses     Dysfunction of both eustachian tubes    -  1       Follow-ups after your visit        Follow-up notes from your care team     Return in about 2 weeks (around 9/28/2018) for Follow up.      Your next 10 appointments already scheduled     Oct 08, 2018  8:40 AM CDT   [facility]YSICAL with ROCIO Kan CNP   [facility]    [address] [facility] [address] [phone]              Who to contact     If you have questions or need follow up information about today's clinic visit or your schedule please contact [facility] directly at [phone].  Normal or non-critical lab and imaging results will be communicated to you by Flavour[employer]art, letter or phone within 4 business days after the clinic has received the results. If you do not hear from us within 7 days, please contact the clinic through Flavourshart or phone. If you have a critical or abnormal lab result, we will notify you by phone as soon as possible.  Submit refill requests through [employer] or call your pharmacy and they will forward the refill request to us. Please allow 3 business days for your refill to be completed.          Additional Information About Your Visit        MyChart Information     [employer] gives you secure access to your electronic health record. If you see a primary care provider, you can also send messages to your care team and make appointments. If you have questions, please call your primary care clinic.  If you do not have a primary care provider, please call [phone] and they will assist you.        Care EveryWhere ID     This is your Care EveryWhere ID. This could be used by  other organizations to access your Telephone medical records  [id number]        Your Vitals Were     Pulse Temperature Respirations Pulse Oximetry Breastfeeding? BMI (Body Mass Index)    88 97.5  F (36.4  C) (Temporal) 24 97% No 18.19 kg/m2       Blood Pressure from Last 3 Encounters:   09/14/18 152/88   04/05/18 132/84   03/29/18 126/72    Weight from Last 3 Encounters:   09/14/18 102 lb 11.2 oz (46.6 kg)   04/05/18 106 lb (48.1 kg)   03/29/18 107 lb (48.5 kg)              Today, you had the following     No orders found for display         Today's Medication Changes          These changes are accurate as of 9/14/18 11:59 PM.  If you have any questions, ask your nurse or doctor.               Start taking these medicines.        Dose/Directions    cetirizine 10 MG tablet   Commonly known as:  zyrTEC   Used for:  Dysfunction of both eustachian tubes   Started by:  Hugo Carroll DO        Dose:  10 mg   Take 1 tablet (10 mg) by mouth every evening   Quantity:  30 tablet   Refills:  0       predniSONE 20 MG tablet   Commonly known as:  DELTASONE   Used for:  Dysfunction of both eustachian tubes   Started by:  Hugo Carroll DO        Dose:  40 mg   Take 2 tablets (40 mg) by mouth daily for 5 days   Quantity:  10 tablet   Refills:  0            Where to get your medicines      These medications [facility] to [facility] [address]  [address] [facility] [address]     Phone:  [phone]     cetirizine 10 MG tablet    predniSONE 20 MG tablet                Primary Care Provider Office Phone # Fax #    Hugo Carroll [phone]       [address] [place] [address]        Equal Access to Services     GEORGE CLARK AH: Kellie Horta, waaxda luqadaha, qaybta kaalmada adelexxyaharini, dhara marroquin. So [facility] [phone].    ATENCIÓN: Si habla español, tiene a richardson disposición  servicios gratuitos de asistencia lingüística. Mando macario [phone].    We comply with applicable federal civil rights laws and [place] laws. We do not discriminate on the basis of race, color, national origin, age, disability, sex, sexual orientation, or gender identity.            Thank you!     Thank you for choosing [facility]  for your care. Our goal is always to provide you with excellent care. Hearing back from our patients is one way we can continue to improve our services. Please take a few minutes to complete the written survey that you may receive in the mail after your visit with us. Thank you!             Your Updated Medication List - Protect others around you: Learn how to safely use, store and throw away your medicines at www.disposemymeds.org.          This list is accurate as of 9/14/18 11:59 PM.  Always use your most recent med list.                   Brand Name Dispense Instructions for use Diagnosis    augmented betamethasone dipropionate 0.05 % cream    DIPROLENE-AF    50 g    Apply sparingly to affected area twice daily as needed.  Do not apply to face.    Atopic dermatitis, unspecified type       carbidopa-levodopa  MG per CR tablet    SINEMET CR    90 tablet    TAKE ONE TABLET BY MOUTH EVERY NIGHT AT BEDTIME    Restless leg syndrome       cetirizine 10 MG tablet    zyrTEC    30 tablet    Take 1 tablet (10 mg) by mouth every evening    Dysfunction of both eustachian tubes       predniSONE 20 MG tablet    DELTASONE    10 tablet    Take 2 tablets (40 mg) by mouth daily for 5 days    Dysfunction of both eustachian tubes

## 2018-09-14 NOTE — PROGRESS NOTES
Chief Complaint   Patient presents with     Ear Problem     right ear     Health Maintenance reviewed at today's visit patient asked to schedule/complete:   Cervical Cancer:  Patient agrees to schedule    CHIEF COMPLAINT:    The patient is a pleasant 62-year-old female who presents today with right ear pain. She's had it now for several weeks. She's had no fever or chills. She denies any discharge or drainage. She notes that everything sounds muffled, she has a little bit of vertigo, and she has been slightly more tired than usual. She states that this is somewhat dependent upon the weather and the weather changes.                       PAST, FAMILY,SOCIAL HISTORY:     Medical  History:   has a past medical history of Restless legs syndrome.     Surgical History:   has a past surgical history that includes no history of surgery and Colonoscopy (N/A, 9/5/2014).     Social History:   reports that she has been smoking Cigarettes.  She has a 20.00 pack-year smoking history. She has never used smokeless tobacco. She reports that she drinks about 8.4 oz of alcohol per week  She reports that she does not use illicit drugs.     Family History:  family history includes Alzheimer Disease in her mother; Arthritis in her father and mother; C.A.D. in her father; Cancer in her maternal grandfather; HEART DISEASE in her father; Hypertension in her mother.            MEDICATIONS  Current Outpatient Prescriptions   Medication Sig Dispense Refill     augmented betamethasone dipropionate (DIPROLENE-AF) 0.05 % cream Apply sparingly to affected area twice daily as needed.  Do not apply to face. 50 g 0     carbidopa-levodopa (SINEMET CR)  MG per CR tablet TAKE ONE TABLET BY MOUTH EVERY NIGHT AT BEDTIME 90 tablet 1     cetirizine (ZYRTEC) 10 MG tablet Take 1 tablet (10 mg) by mouth every evening 30 tablet 0     predniSONE (DELTASONE) 20 MG tablet Take 2 tablets (40 mg) by mouth daily for 5 days 10 tablet 0          --------------------------------------------------------------------------------------------------------------------                          REVIEW OF SYSTEMS:         LUNGS: Pt denies: cough,excess sputum, hemoptysis, or shortness of breath.   HEART: Pt denies: chest pain, arrythmia, syncope, tachy or bradyarrhythmia or excess edema.   GI: Pt denies: nausea, vomitting, diarrhea, constipation, melena, or hematochezia.   NEURO: Pt denies: seizures, strokes, diplopia, weakness, paraesthesias, or paralysis.   SKIN: Pt denies: itching, rashes, discoloration, or specific lesions of concern. Denies recent hair loss.   PSYCH: The patient denies significant depression, anxiety, mood imbalance. Specifically denies any suicidal ideation.                          EXAMINATION:         /88 (BP Location: Right arm, Patient Position: Chair, Cuff Size: Adult Regular)  Pulse 88  Temp 97.5  F (36.4  C) (Temporal)  Resp 24  Wt 102 lb 11.2 oz (46.6 kg)  SpO2 97%  Breastfeeding? No  BMI 18.19 kg/m2   Constitutional: The patient appears to be in no acute distress. The patient appears to be adequately hydrated. No acute respiratory or hemodynamic distress is noted at this time.   LUNGS: clear bilaterally, airflow is brisk, no intercostal retraction or stridor is noted. No coughing is noted during visit.   HEART:  regular without rubs, clicks, gallops, or murmurs. PMI is nondisplaced. Upstrokes are brisk. S1,S2 are heard.   GI: Abdomen is soft, without rebound, guarding or tenderness. Bowel sounds are appropriate. No renal bruits are heard.    NEURO: Pt is alert and appropriate. No neurologic lateralization is noted. Cranial nerves 2-12 are intact. Peripheral sensory and motor function are grossly normal              ENT: Nasal mucosa is moist, no exudates are seen. No obstruction is present. Pharynx is clear of exudates, no significant cervical adenopathy is present. Tympanic membranes are bilaterally  Retracted with  fluid level noted. No significant erythema is present at this time.                          DECISION MAKIN. Dysfunction of both eustachian tubes  Will place patient on a short course of antihistamine and cortisone. Would avoid decongestant usage.  No signs of infection or indication for antibiotic at this time.  Recommend smoking cessation  - predniSONE (DELTASONE) 20   MG tablet; Take 2 tablets (40 mg) by mouth daily for 5 days  Dispense: 10 tablet; Refill: 0  - cetirizine (ZYRTEC) 10 MG tablet; Take 1 tablet (10 mg) by mouth every evening  Dispense: 30 tablet; Refill: 0                               FOLLOW UP    I have asked the patient to make an appointment for follow up with me in 2 weeks or as needed        I have carefully explained the diagnosis and treatment options with the patient. The patient has displayed an understanding of the above, and all subsequent questions were answered.         DO RAYMOND Stewart    Portions of this note were produced using [employer]ee[employer]AVES  Although every attempt at real-time proof reading has been made, occasional grammar/syntax errors may have been missed.

## 2018-10-08 ENCOUNTER — OFFICE VISIT (OUTPATIENT)
Dept: FAMILY MEDICINE | Facility: OTHER | Age: 63
End: 2018-10-08
Payer: COMMERCIAL

## 2018-10-08 VITALS
TEMPERATURE: 97 F | BODY MASS INDEX: 18.33 KG/M2 | HEART RATE: 94 BPM | OXYGEN SATURATION: 98 % | DIASTOLIC BLOOD PRESSURE: 80 MMHG | SYSTOLIC BLOOD PRESSURE: 130 MMHG | WEIGHT: 99.6 LBS | RESPIRATION RATE: 20 BRPM | HEIGHT: 62 IN

## 2018-10-08 DIAGNOSIS — Z23 NEED FOR PROPHYLACTIC VACCINATION AND INOCULATION AGAINST INFLUENZA: ICD-10-CM

## 2018-10-08 DIAGNOSIS — H69.93 DYSFUNCTION OF BOTH EUSTACHIAN TUBES: ICD-10-CM

## 2018-10-08 DIAGNOSIS — Z72.0 TOBACCO ABUSE: ICD-10-CM

## 2018-10-08 DIAGNOSIS — Z12.4 SCREENING FOR MALIGNANT NEOPLASM OF CERVIX: ICD-10-CM

## 2018-10-08 DIAGNOSIS — Z13.1 SCREENING FOR DIABETES MELLITUS: ICD-10-CM

## 2018-10-08 DIAGNOSIS — Z11.59 NEED FOR HEPATITIS C SCREENING TEST: ICD-10-CM

## 2018-10-08 DIAGNOSIS — Z13.6 CARDIOVASCULAR SCREENING; LDL GOAL LESS THAN 160: ICD-10-CM

## 2018-10-08 DIAGNOSIS — Z71.6 TOBACCO ABUSE COUNSELING: ICD-10-CM

## 2018-10-08 DIAGNOSIS — Z00.00 ROUTINE HISTORY AND PHYSICAL EXAMINATION OF ADULT: Primary | ICD-10-CM

## 2018-10-08 LAB
CHOLEST SERPL-MCNC: 216 MG/DL
GLUCOSE SERPL-MCNC: 81 MG/DL (ref 70–99)
HDLC SERPL-MCNC: 121 MG/DL
LDLC SERPL CALC-MCNC: 84 MG/DL
NONHDLC SERPL-MCNC: 95 MG/DL
TRIGL SERPL-MCNC: 56 MG/DL

## 2018-10-08 PROCEDURE — 99396 PREV VISIT EST AGE 40-64: CPT | Mod: 25 | Performed by: NURSE PRACTITIONER

## 2018-10-08 PROCEDURE — 90471 IMMUNIZATION ADMIN: CPT | Performed by: NURSE PRACTITIONER

## 2018-10-08 PROCEDURE — 82947 ASSAY GLUCOSE BLOOD QUANT: CPT | Performed by: NURSE PRACTITIONER

## 2018-10-08 PROCEDURE — 80061 LIPID PANEL: CPT | Performed by: NURSE PRACTITIONER

## 2018-10-08 PROCEDURE — 86803 HEPATITIS C AB TEST: CPT | Performed by: NURSE PRACTITIONER

## 2018-10-08 PROCEDURE — G0476 HPV COMBO ASSAY CA SCREEN: HCPCS | Performed by: NURSE PRACTITIONER

## 2018-10-08 PROCEDURE — 90682 RIV4 VACC RECOMBINANT DNA IM: CPT | Performed by: NURSE PRACTITIONER

## 2018-10-08 PROCEDURE — G0145 SCR C/V CYTO,THINLAYER,RESCR: HCPCS | Performed by: NURSE PRACTITIONER

## 2018-10-08 PROCEDURE — 36415 COLL VENOUS BLD VENIPUNCTURE: CPT | Performed by: NURSE PRACTITIONER

## 2018-10-08 RX ORDER — CETIRIZINE HYDROCHLORIDE 10 MG/1
10 TABLET ORAL EVERY EVENING
Qty: 30 TABLET | Refills: 0 | Status: SHIPPED | OUTPATIENT
Start: 2018-10-08 | End: 2019-04-16

## 2018-10-08 ASSESSMENT — ENCOUNTER SYMPTOMS
CONSTIPATION: 0
DIARRHEA: 0
FREQUENCY: 0
DYSURIA: 0
WEAKNESS: 0
NERVOUS/ANXIOUS: 0
MYALGIAS: 0
ABDOMINAL PAIN: 0
EYE PAIN: 0
FEVER: 0
PALPITATIONS: 0
DIZZINESS: 0
NAUSEA: 0
ARTHRALGIAS: 0
HEADACHES: 0
SORE THROAT: 0
HEMATURIA: 0
COUGH: 0
BREAST MASS: 0
PARESTHESIAS: 0
SHORTNESS OF BREATH: 0
CHILLS: 0
HEARTBURN: 0
HEMATOCHEZIA: 0
JOINT SWELLING: 0

## 2018-10-08 ASSESSMENT — PAIN SCALES - GENERAL: PAINLEVEL: NO PAIN (0)

## 2018-10-08 NOTE — MR AVS SNAPSHOT
After Visit Summary   10/8/2018    Sally Ware    MRN: [id number]           Patient Information     Date Of Birth          1955        Visit Information        Provider Department      10/8/2018 8:40 AM Elisabeth Clarke APRN [facility]        Today's Diagnoses     Routine history and physical examination of adult    -  1    Tobacco abuse        Tobacco abuse counseling        Screening for malignant neoplasm of cervix        CARDIOVASCULAR SCREENING; LDL GOAL LESS THAN 160        Screening for diabetes mellitus        Dysfunction of both eustachian tubes        Need for prophylactic vaccination and inoculation against influenza        Need for hepatitis C screening test          Care Instructions    The results of the pap test take about 2 weeks to come back.  We will send a letter with these results and the recommendations for further pap tests in the future.       Labs will be done today.   For normal results, you will receive a letter with the results in about 2 weeks.  If anything is abnormal or unexpected, someone from the clinic will call you.              HOW TO QUIT SMOKING  Smoking is one of the hardest habits to break. About half of all those who have ever smoked have been able to quit, and most of those (about 70%) who still smoke want to quit. Here are some of the best ways to stop smoking.     KEEP TRYING:  It takes most smokers about 8 tries before they are finally able to fully quit. So, the more often you try and fail, the better your chance of quitting the next time! So, don't give up!    GO COLD TURKEY:  Most ex-smokers quit cold turkey. Trying to cut back gradually doesn't seem to work as well, perhaps because it continues the smoking habit. Also, it is possible to fool yourself by inhaling more while smoking fewer cigarettes. This results in the same amount of nicotine in your body!    GET SUPPORT:  Support programs can make an important difference,  especially for the heavy smoker. These groups offer lectures, methods to change your behavior and peer support. Call the free national Quitline for more information. 800-QUIT-NOW (825-167-6268). Low-cost or free programs are offered by many hospitals, local chapters of the American Lung Association (787-476-1911) and the American Cancer Society (030-182-4477). Support at home is important too. Non-smokers can help by offering praise and encouragement. If the smoker fails to quit, encourage them to try again!    OVER-THE-COUNTER MEDICINES:  For those who can't quit on their own, Nicotine Replacement Therapy (NRT) may make quitting much easier. Certain aids such as the nicotine patch, gum and lozenge are available without a prescription. However, it is best to use these under the guidance of your doctor. The skin patch provides a steady supply of nicotine to the body. Nicotine gum and lozenge gives temporary bursts of low levels of nicotine. Both methods take the edge off the craving for cigarettes. WARNING: If you feel symptoms of nicotine overdose, such as nausea, vomiting, dizziness, weakness, or fast heartbeat, stop using these and see your doctor.    PRESCRIPTION MEDICINES:  After evaluating your smoking patterns and prior attempts at quitting, your doctor may offer a prescription medicine such as bupropion (Zyban, Wellbutrin), varenicline (Chantix, Champix), a niocotine inhaler or nasal spray. Each has its unique advantage and side effects which your doctor can review with you.    HEALTH BENEFITS OF QUITTING:  The benefits of quitting start right away and keep improving the longer you go without smokin minutes: blood pressure and pulse return to normal  8 hours: oxygen levels return to normal  2 days: ability to smell and taste begins to improve as damaged nerves start to regrow  2-3 weeks: circulation and lung function improves  1-9 months: decreased cough, congestion and shortness of breath; less  tired  1 year: risk of heart attack decreases by half  5 years: risk of lung cancer decreases by half; risk of stroke becomes the same as a non-smoker  For information about how to quit smoking, visit the following links:  National [employer] [place] ,   Clearing the Air, Quit Smoking Today   - an online booklet. http://www.smokefree.gov/pubs/clearing_the_air.pdf  Smokefree.gov http://smokefree.gov/  QuitNet http://www.quitnet.com/    0787-4267 Eugene Garcia, [address] [place] [address] 18155. All rights reserved. This information is not intended as a substitute for professional medical care. Always follow your healthcare professional's instructions.    The Benefits of Living Smoke Free  What do you want to gain from quitting? Check off some reasons to quit.  Health Benefits  ___ Reduce my risk of lung cancer, heart disease, chronic lung disease  ___ Have fewer wrinkles and softer skin  ___ Improve my sense of taste and smell  ___ For pregnant women--reduce the risk of having a miscarriage, stillbirth, premature birth, or low-birth-weight baby  Personal Benefits  ___ Feel more in control of my life  ___ Have better-smelling hair, breath, clothes, home, and car  ___ Save time by not having to take smoke breaks, buy cigarettes, or hunt for a light  ___ Have whiter teeth  Family Benefits  ___ Reduce my children s respiratory tract infections  ___ Set a good example for my children  ___ Reduce my family s cancer risk  Financial Benefits  ___ Save hundreds of dollars each year that would be spent on cigarettes  ___ Save money on medical bills  ___ Save on life, health, and car insurance premiums    Those Dollars Add Up!  Cigarettes are expensive, and getting more expensive all the time. Do you realize how much money you are spending on cigarettes per year? What is the average amount you spend on a pack of cigarettes? What is the average number of packs that you smoke per day? Using your answers to these  questions, fill in this formula to help you find out:  ($ _____ per pack) ×  ( _____ number of packs per day) × (365 days) =  $ _____ yearly cost of smoking  Besides tobacco, there are other costs, including extra cleaning bills and replacement costs for clothing and furniture; medical expenses for smoking-related illnesses; and higher health, life, and car insurance premiums.    Cigars and Pipes Count Too!  Cigars and pipes are also dangerous. So are smokeless (chewing) tobacco and snuff. All of these products contain nicotine, a highly addictive substance that has harmful effects on your body. Quitting smoking means giving up all tobacco products.      6843-4664 [facility], [address] [place] [address], [place] [address]. All rights reserved. This information is not intended as a substitute for professional medical care. Always follow your healthcare professional's instructions.  Preventive Health Recommendations  Female Ages 50 - 64    Yearly exam: See your health care provider every year in order to  o Review health changes.   o Discuss preventive care.    o Review your medicines if your doctor has prescribed any.      Get a Pap test every three years (unless you have an abnormal result and your provider advises testing more often).    If you get Pap tests with HPV test, you only need to test every 5 years, unless you have an abnormal result.     You do not need a Pap test if your uterus was removed (hysterectomy) and you have not had cancer.    You should be tested each year for STDs (sexually transmitted diseases) if you're at risk.     Have a mammogram every 1 to 2 years.    Have a colonoscopy at age 50, or have a yearly FIT test (stool test). These exams screen for colon cancer.      Have a cholesterol test every 5 years, or more often if advised.    Have a diabetes test (fasting glucose) every three years. If you are at risk for diabetes, you should have this test more often.     If you are at risk for  osteoporosis (brittle bone disease), think about having a bone density scan (DEXA).    Shots: Get a flu shot each year. Get a tetanus shot every 10 years.    Nutrition:     Eat at least 5 servings of fruits and vegetables each day.    Eat whole-grain bread, whole-wheat pasta and brown rice instead of white grains and rice.    Get adequate Calcium and Vitamin D.     Lifestyle    Exercise at least 150 minutes a week (30 minutes a day, 5 days a week). This will help you control your weight and prevent disease.    Limit alcohol to one drink per day.    No smoking.     Wear sunscreen to prevent skin cancer.     See your dentist every six months for an exam and cleaning.    See your eye doctor every 1 to 2 years.            Follow-ups after your visit        Additional Services     QUITPLAN  Referral       [place] [facility] FA[facility]  Fax form to: 1 [phone]    The clinic will facilitate the referral to the quitline.    Provider Information:  ===============================================================  ROCIO Kan CNP  ID#: [id number] - [facility]: [facility] [phone] Fax: [phone]   [email or web address]  Payor: [facility] / Plan: [facility] [place] / Product Type: HMO /   ===============================================================    The Public Health Service Guideline does not recommend providing over-the-counter nicotine replacement therapy products without physician authorization to patients with the following conditions: pregnancy, uncontrolled high blood pressure, or cardiovascular diseases.     I authorize the [place] Tobacco Quitlines to provide over-the-counter nicotine replacement products for the patient listed below if the patient's health plan benefits cover NRT or if the patient is eligible for QUITPLAN services.    Patient Consented to:  ===============================================================  - YES - I am ready to  quit tobacco and request the above information be given to the quitline so they may contact me.  I understand that one of [place]'s Tobacco Quitlines will inform my provider about my participation.  ===============================================================  Please check the BEST 3-hour call window for them to reach you:   May we leave a message?  YES  Language Preference:  English  Phone Number: Home Phone      [phone]  Mobile          [phone]     E-mail Address: [email or web address]    ========================================================================  FOR QUITLINE USE ONLY:  THIS INFORMATION WILL BE PROVIDED BACK TO THE PROVIDER  Contact date: __/ __/__ or ____ Did not reach after three attempts.    Outcome:  __ Enrolled in telephone counseling program  __ Declined  __ Not Reached    Stage of readiness: _______________________  Planned Quit Date: ___/ ___/ ___  Comments:      2011 [facility]   This message funded by [employer] [place], an independent licensee of the Blue Cross and Blue Shield Association. Rev. 11/1/12                  Follow-up notes from your care team     Return in about 1 year (around 10/8/2019) for Physical Exam.      Future tests that were ordered for you today     Open Future Orders        Priority Expected Expires Ordered    QUITPLAN  Referral Routine  12/7/2018 10/8/2018            Who to contact     If you have questions or need follow up information about today's clinic visit or your schedule please contact [facility] directly at [phone].  Normal or non-critical lab and imaging results will be communicated to you by MyChart, letter or phone within 4 business days after the clinic has received the results. If you do not hear from us within 7 days, please contact the clinic through [employer] or phone. If you have a critical or abnormal lab result, we will notify you by phone as soon as possible.  Submit refill  "requests through [employer] or call your pharmacy and they will forward the refill request to us. Please allow 3 business days for your refill to be completed.          Additional Information About Your Visit        [employer] Information     [employer] gives you secure access to your electronic health record. If you see a primary care provider, you can also send messages to your care team and make appointments. If you have questions, please call your primary care clinic.  If you do not have a primary care provider, please call [phone] and they will assist you.        Care EveryWhere ID     This is your Care EveryWhere ID. This could be used by other organizations to access your [facility] medical records  [id number]        Your Vitals Were     Pulse Temperature Respirations Height Last Period Pulse Oximetry    94 97  F (36.1  C) (Tympanic) 20 5' 2\" (1.575 m) (LMP Unknown) 98%    Breastfeeding? BMI (Body Mass Index)                No 18.22 kg/m2           Blood Pressure from Last 3 Encounters:   10/08/18 130/80   09/14/18 152/88   04/05/18 132/84    Weight from Last 3 Encounters:   10/08/18 99 lb 9.6 oz (45.2 kg)   09/14/18 102 lb 11.2 oz (46.6 kg)   04/05/18 106 lb (48.1 kg)              We Performed the Following     FLU VACCINE, (RIV4) RECOMBINANT HA  , IM (FluBlok, egg free) [85015]- >18 YRS ([facility] recommended  50-64 YRS)     GLUCOSE     Hepatitis C Screen Reflex to HCV RNA Quant and Genotype     HPV High Risk Types DNA Cervical     Lipid panel reflex to direct LDL Fasting     Pap imaged thin layer screen with HPV - recommended age 30 - 65     Tobacco Cessation - Order to Satisfy Health Maintenance     Vaccine Administration, Initial [50309]          Where to get your medicines      These medications were sent to [facility] [place], [place] - [address]   [address] [facility] [address]     Phone:  [phone]     cetirizine 10 MG tablet          Primary Care Provider Office Phone # Fax #    Hugo " Jeffrey Carroll, [phone]       [address] [place] [address]        Equal Access to Services     GEORGE CLARK : Hadii aad ku hadana Horta, wacelestinoda luqsharee, abdonta kaisatuda wesly, dhara quispe laEmilyregla marroquin. So [facility] [phone].    ATENCIÓN: Si habla español, tiene a richardson disposición servicios gratuitos de asistencia lingüística. Llame al [phone].    We comply with applicable federal civil rights laws and [place] laws. We do not discriminate on the basis of race, color, national origin, age, disability, sex, sexual orientation, or gender identity.            Thank you!     Thank you for choosing [facility]  for your care. Our goal is always to provide you with excellent care. Hearing back from our patients is one way we can continue to improve our services. Please take a few minutes to complete the written survey that you may receive in the mail after your visit with us. Thank you!             Your Updated Medication List - Protect others around you: Learn how to safely use, store and throw away your medicines at www.disposemymeds.org.          This list is accurate as of 10/8/18  9:14 AM.  Always use your most recent med list.                   Brand Name Dispense Instructions for use Diagnosis    augmented betamethasone dipropionate 0.05 % cream    DIPROLENE-AF    50 g    Apply sparingly to affected area twice daily as needed.  Do not apply to face.    Atopic dermatitis, unspecified type       carbidopa-levodopa  MG per CR tablet    SINEMET CR    90 tablet    TAKE ONE TABLET BY MOUTH EVERY NIGHT AT BEDTIME    Restless leg syndrome       cetirizine 10 MG tablet    zyrTEC    30 tablet    Take 1 tablet (10 mg) by mouth every evening    Dysfunction of both eustachian tubes

## 2018-10-08 NOTE — PROGRESS NOTES
SUBJECTIVE:   CC: Sally Ware is an 62 year old woman who presents for preventive health visit.     Physical   Annual:     Getting at least 3 servings of Calcium per day:  Yes    Bi-annual eye exam:  NO    Dental care twice a year:  NO    Sleep apnea or symptoms of sleep apnea:  None    Diet:  Regular (no restrictions)    Frequency of exercise:  None    Taking medications regularly:  Yes    Medication side effects:  None    Additional concerns today:  No        Today's PHQ-2 Score:   PHQ-2 ( 1999 Pfizer) 10/8/2018   Q1: Little interest or pleasure in doing things 0   Q2: Feeling down, depressed or hopeless 0   PHQ-2 Score 0   Q1: Little interest or pleasure in doing things Not at all   Q2: Feeling down, depressed or hopeless Not at all   PHQ-2 Score 0       Abuse: Current or Past(Physical, Sexual or Emotional)- No  Do you feel safe in your environment - Yes    Social History   Substance Use Topics     Smoking status: Current Every Day Smoker     Packs/day: 0.50     Years: 40.00     Types: Cigarettes     Smokeless tobacco: Never Used     Alcohol use 8.4 oz/week     14 Standard drinks or equivalent per week      Comment: 2 drinks per day     Alcohol Use 10/8/2018   If you drink alcohol do you typically have greater than 3 drinks per day OR greater than 7 drinks per week? No   No flowsheet data found.    Reviewed orders with patient.  Reviewed health maintenance and updated orders accordingly - Yes  Labs reviewed in EPIC  BP Readings from Last 3 Encounters:   10/08/18 130/80   09/14/18 152/88   04/05/18 132/84    Wt Readings from Last 3 Encounters:   10/08/18 99 lb 9.6 oz (45.2 kg)   09/14/18 102 lb 11.2 oz (46.6 kg)   04/05/18 106 lb (48.1 kg)                  Patient Active Problem List   Diagnosis     Cellulitis of left upper arm and forearm - suspect secondary to Staph aureus     Sepsis (H)     Restless leg syndrome     Tobacco use disorder (SMOKERS')     DVT prophylaxis     Advanced directives,  counseling/discussion     Hypokalemia     Wound, open, dorsal left hand at 5th MCP     Axillary swelling left     Suspect Staph infection     CARDIOVASCULAR SCREENING; LDL GOAL LESS THAN 160     Past Surgical History:   Procedure Laterality Date     COLONOSCOPY N/A 9/5/2014    Procedure: COMBINED COLONOSCOPY, SINGLE BIOPSY/POLYPECTOMY BY BIOPSY;  Surgeon: Turner Freitas MD;  Location: [facility]     NO HISTORY OF SURGERY         Social History   Substance Use Topics     Smoking status: Current Every Day Smoker     Packs/day: 0.50     Years: 40.00     Types: Cigarettes     Smokeless tobacco: Never Used     Alcohol use 8.4 oz/week     14 Standard drinks or equivalent per week      Comment: 2 drinks per day     Family History   Problem Relation Age of Onset     Alzheimer Disease Mother      dementia     Arthritis Mother      Hypertension Mother      C.A.D. Father      HEART DISEASE Father      Arthritis Father      RA     Cancer Maternal Grandfather      Brain Tumor         Current Outpatient Prescriptions   Medication Sig Dispense Refill     augmented betamethasone dipropionate (DIPROLENE-AF) 0.05 % cream Apply sparingly to affected area twice daily as needed.  Do not apply to face. 50 g 0     carbidopa-levodopa (SINEMET CR)  MG per CR tablet TAKE ONE TABLET BY MOUTH EVERY NIGHT AT BEDTIME 90 tablet 1     cetirizine (ZYRTEC) 10 MG tablet Take 1 tablet (10 mg) by mouth every evening 30 tablet 0     No Known Allergies    Patient over age 50, mutual decision to screen reflected in health maintenance.    Pertinent mammograms are reviewed under the imaging tab.  History of abnormal Pap smear: NO - age 30-65 PAP every 5 years with negative HPV co-testing recommended  PAP / HPV 8/18/2014   PAP NIL     Reviewed and updated as needed this visit by clinical staff  Tobacco  Allergies  Meds  Soc Hx        Reviewed and updated as needed this visit by Provider        Past Medical History:   Diagnosis Date     Restless  "legs syndrome       Past Surgical History:   Procedure Laterality Date     COLONOSCOPY N/A 9/5/2014    Procedure: COMBINED COLONOSCOPY, SINGLE BIOPSY/POLYPECTOMY BY BIOPSY;  Surgeon: Turner Freitas MD;  Location: [facility]     NO HISTORY OF SURGERY         Review of Systems   Constitutional: Negative for chills and fever.   HENT: Positive for hearing loss. Negative for congestion, ear pain and sore throat.    Eyes: Negative for pain and visual disturbance.   Respiratory: Negative for cough and shortness of breath.    Cardiovascular: Negative for chest pain, palpitations and peripheral edema.   Gastrointestinal: Negative for abdominal pain, constipation, diarrhea, heartburn, hematochezia and nausea.   Breasts:  Negative for tenderness, breast mass and discharge.   Genitourinary: Negative for dysuria, frequency, genital sores, hematuria, pelvic pain, urgency, vaginal bleeding and vaginal discharge.   Musculoskeletal: Negative for arthralgias, joint swelling and myalgias.   Skin: Negative for rash.   Neurological: Negative for dizziness, weakness, headaches and paresthesias.   Psychiatric/Behavioral: Negative for mood changes. The patient is not nervous/anxious.           OBJECTIVE:   /80  Pulse 94  Temp 97  F (36.1  C) (Tympanic)  Resp 20  Ht 5' 2\" (1.575 m)  Wt 99 lb 9.6 oz (45.2 kg)  LMP  (LMP Unknown)  SpO2 98%  Breastfeeding? No  BMI 18.22 kg/m2  Physical Exam  GENERAL APPEARANCE: healthy, alert and no distress  EYES: Eyes grossly normal to inspection, PERRL and conjunctivae and sclerae normal  HENT: ear canals normal, clear effusion behind right TM, left TM normal, nose and mouth without ulcers or lesions, oropharynx clear and oral mucous membranes moist  NECK: no adenopathy, no asymmetry, masses, or scars and thyroid normal to palpation  RESP: lungs clear to auscultation - no rales, rhonchi or wheezes  CV: regular rate and rhythm, normal S1 S2, no S3 or S4, no murmur, click or rub, no " peripheral edema and peripheral pulses strong  ABDOMEN: soft, nontender, no hepatosplenomegaly, no masses and bowel sounds normal   (female): normal female external genitalia, normal urethral meatus, vaginal mucosal atrophy noted, normal cervix, adnexae, and uterus without masses or abnormal discharge  MS: no musculoskeletal defects are noted and gait is age appropriate without ataxia  SKIN: no suspicious lesions or rashes  NEURO: Normal strength and tone, sensory exam grossly normal, mentation intact and speech normal  PSYCH: mentation appears normal and affect normal/bright    Diagnostic Test Results:  Pending     ASSESSMENT/PLAN:   1. Routine history and physical examination of adult    2. Tobacco abuse  - QUITPLAN  Referral; Future  - Tobacco Cessation - Order to Satisfy Health Maintenance    3. Tobacco abuse counseling  Declines any medications today    4. Screening for malignant neoplasm of cervix  - Pap imaged thin layer screen with HPV - recommended age 30 - 65  - HPV High Risk Types DNA Cervical    5. CARDIOVASCULAR SCREENING; LDL GOAL LESS THAN 160  - Lipid panel reflex to direct LDL Fasting    6. Screening for diabetes mellitus  - GLUCOSE    7. Dysfunction of both eustachian tubes  - cetirizine (ZYRTEC) 10 MG tablet; Take 1 tablet (10 mg) by mouth every evening  Dispense: 30 tablet; Refill: 0    8. Need for prophylactic vaccination and inoculation against influenza  - Vaccine Administration, Initial [93093]  - FLU VACCINE, (RIV4) RECOMBINANT HA  , IM (FluBlok, egg free) [00740]- >18 YRS ([facility] recommended  50-64 YRS)    9. Need for hepatitis C screening test  - Hepatitis C Screen Reflex to HCV RNA Quant and Genotype    COUNSELING:  Reviewed preventive health counseling, as reflected in patient instructions       Regular exercise       Healthy diet/nutrition    BP Readings from Last 1 Encounters:   10/08/18 130/80     Estimated body mass index is 18.22 kg/(m^2) as calculated from the following:    Height  "as of this encounter: 5' 2\" (1.575 m).    Weight as of this encounter: 99 lb 9.6 oz (45.2 kg).           reports that she has been smoking Cigarettes.  She has a 20.00 pack-year smoking history. She has never used smokeless tobacco.  Tobacco Cessation Action Plan: Information offered: Patient not interested at this time    Counseling Resources:  ATP IV Guidelines  Pooled Cohorts Equation Calculator  Breast Cancer Risk Calculator  FRAX Risk Assessment  ICSI Preventive Guidelines  Dietary Guidelines for Americans, 2010  USDA's MyPlate  ASA Prophylaxis  Lung CA Screening    ROC[facility] Kan [facility]  "

## 2018-10-08 NOTE — PATIENT INSTRUCTIONS
The results of the pap test take about 2 weeks to come back.  We will send a letter with these results and the recommendations for further pap tests in the future.       Labs will be done today.   For normal results, you will receive a letter with the results in about 2 weeks.  If anything is abnormal or unexpected, someone from the clinic will call you.              HOW TO QUIT SMOKING  Smoking is one of the hardest habits to break. About half of all those who have ever smoked have been able to quit, and most of those (about 70%) who still smoke want to quit. Here are some of the best ways to stop smoking.     KEEP TRYING:  It takes most smokers about 8 tries before they are finally able to fully quit. So, the more often you try and fail, the better your chance of quitting the next time! So, don't give up!    GO COLD TURKEY:  Most ex-smokers quit cold turkey. Trying to cut back gradually doesn't seem to work as well, perhaps because it continues the smoking habit. Also, it is possible to fool yourself by inhaling more while smoking fewer cigarettes. This results in the same amount of nicotine in your body!    GET SUPPORT:  Support programs can make an important difference, especially for the heavy smoker. These groups offer lectures, methods to change your behavior and peer support. Call the free national Quitline for more information. 800-QUIT-NOW (334-128-7167). Low-cost or free programs are offered by many hospitals, local chapters of the American Lung Association (516-394-0312) and the American Cancer Society (860-916-9750). Support at home is important too. Non-smokers can help by offering praise and encouragement. If the smoker fails to quit, encourage them to try again!    OVER-THE-COUNTER MEDICINES:  For those who can't quit on their own, Nicotine Replacement Therapy (NRT) may make quitting much easier. Certain aids such as the nicotine patch, gum and lozenge are available without a prescription. However,  it is best to use these under the guidance of your doctor. The skin patch provides a steady supply of nicotine to the body. Nicotine gum and lozenge gives temporary bursts of low levels of nicotine. Both methods take the edge off the craving for cigarettes. WARNING: If you feel symptoms of nicotine overdose, such as nausea, vomiting, dizziness, weakness, or fast heartbeat, stop using these and see your doctor.    PRESCRIPTION MEDICINES:  After evaluating your smoking patterns and prior attempts at quitting, your doctor may offer a prescription medicine such as bupropion (Zyban, Wellbutrin), varenicline (Chantix, Champix), a niocotine inhaler or nasal spray. Each has its unique advantage and side effects which your doctor can review with you.    HEALTH BENEFITS OF QUITTING:  The benefits of quitting start right away and keep improving the longer you go without smokin minutes: blood pressure and pulse return to normal  8 hours: oxygen levels return to normal  2 days: ability to smell and taste begins to improve as damaged nerves start to regrow  2-3 weeks: circulation and lung function improves  1-9 months: decreased cough, congestion and shortness of breath; less tired  1 year: risk of heart attack decreases by half  5 years: risk of lung cancer decreases by half; risk of stroke becomes the same as a non-smoker  For information about how to quit smoking, visit the following links:  National Cancer [place] ,   Clearing the Air, Quit Smoking Today   - an online booklet. http://www.smokefree.gov/pubs/clearing_the_air.pdf  Smokefree.gov http://smokefree.gov/  QuitNet http://www.quitnet.com/    7071-3786 Eugene Garcia, [address] [place] [address]. All rights reserved. This information is not intended as a substitute for professional medical care. Always follow your healthcare professional's instructions.    The Benefits of Living Smoke Free  What do you want to gain from quitting? Check off some  reasons to quit.  Health Benefits  ___ Reduce my risk of lung cancer, heart disease, chronic lung disease  ___ Have fewer wrinkles and softer skin  ___ Improve my sense of taste and smell  ___ For pregnant women--reduce the risk of having a miscarriage, stillbirth, premature birth, or low-birth-weight baby  Personal Benefits  ___ Feel more in control of my life  ___ Have better-smelling hair, breath, clothes, home, and car  ___ Save time by not having to take smoke breaks, buy cigarettes, or hunt for a light  ___ Have whiter teeth  Family Benefits  ___ Reduce my children s respiratory tract infections  ___ Set a good example for my children  ___ Reduce my family s cancer risk  Financial Benefits  ___ Save hundreds of dollars each year that would be spent on cigarettes  ___ Save money on medical bills  ___ Save on life, health, and car insurance premiums    Those Dollars Add Up!  Cigarettes are expensive, and getting more expensive all the time. Do you realize how much money you are spending on cigarettes per year? What is the average amount you spend on a pack of cigarettes? What is the average number of packs that you smoke per day? Using your answers to these questions, fill in this formula to help you find out:  ($ _____ per pack) ×  ( _____ number of packs per day) × (365 days) =  $ _____ yearly cost of smoking  Besides tobacco, there are other costs, including extra cleaning bills and replacement costs for clothing and furniture; medical expenses for smoking-related illnesses; and higher health, life, and car insurance premiums.    Cigars and Pipes Count Too!  Cigars and pipes are also dangerous. So are smokeless (chewing) tobacco and snuff. All of these products contain nicotine, a highly addictive substance that has harmful effects on your body. Quitting smoking means giving up all tobacco products.      9362-9784 Eugene Garcia, [address] [place] [address], [place] [address]. All rights reserved. This  information is not intended as a substitute for professional medical care. Always follow your healthcare professional's instructions.  Preventive Health Recommendations  Female Ages 50 - 64    Yearly exam: See your health care provider every year in order to  o Review health changes.   o Discuss preventive care.    o Review your medicines if your doctor has prescribed any.      Get a Pap test every three years (unless you have an abnormal result and your provider advises testing more often).    If you get Pap tests with HPV test, you only need to test every 5 years, unless you have an abnormal result.     You do not need a Pap test if your uterus was removed (hysterectomy) and you have not had cancer.    You should be tested each year for STDs (sexually transmitted diseases) if you're at risk.     Have a mammogram every 1 to 2 years.    Have a colonoscopy at age 50, or have a yearly FIT test (stool test). These exams screen for colon cancer.      Have a cholesterol test every 5 years, or more often if advised.    Have a diabetes test (fasting glucose) every three years. If you are at risk for diabetes, you should have this test more often.     If you are at risk for osteoporosis (brittle bone disease), think about having a bone density scan (DEXA).    Shots: Get a flu shot each year. Get a tetanus shot every 10 years.    Nutrition:     Eat at least 5 servings of fruits and vegetables each day.    Eat whole-grain bread, whole-wheat pasta and brown rice instead of white grains and rice.    Get adequate Calcium and Vitamin D.     Lifestyle    Exercise at least 150 minutes a week (30 minutes a day, 5 days a week). This will help you control your weight and prevent disease.    Limit alcohol to one drink per day.    No smoking.     Wear sunscreen to prevent skin cancer.     See your dentist every six months for an exam and cleaning.    See your eye doctor every 1 to 2 years.

## 2018-10-08 NOTE — PROGRESS NOTES
Injectable Influenza Immunization Documentation    1.  Is the person to be vaccinated sick today?   No    2. Does the person to be vaccinated have an allergy to a component   of the vaccine?   No  Egg Allergy Algorithm Link    3. Has the person to be vaccinated ever had a serious reaction   to influenza vaccine in the past?   No    4. Has the person to be vaccinated ever had Guillain-Barré syndrome?   No    Form completed by ................Jorge Luis Ruvalcaba LPN,   October 8, 2018,      9:34 AM,   [facility]     Prior to injection verified patient identity using patient's name and date of birth.  Due to injection administration, patient instructed to remain in clinic for 15 minutes  afterwards, and to report any adverse reaction to me immediately.

## 2018-10-09 LAB — HCV AB SERPL QL IA: NONREACTIVE

## 2018-10-10 NOTE — PROGRESS NOTES
Pt notified of labs via [employer] msg/release.  ................Jorge Luis Ruvalcaba LPN,   October 10, 2018,      2:28 PM,   [facility]

## 2018-10-11 LAB
COPATH REPORT: NORMAL
PAP: NORMAL

## 2018-10-15 LAB
FINAL DIAGNOSIS: NORMAL
HPV HR 12 DNA CVX QL NAA+PROBE: NEGATIVE
HPV16 DNA SPEC QL NAA+PROBE: NEGATIVE
HPV18 DNA SPEC QL NAA+PROBE: NEGATIVE
SPECIMEN DESCRIPTION: NORMAL
SPECIMEN SOURCE CVX/VAG CYTO: NORMAL

## 2019-01-20 DIAGNOSIS — G25.81 RESTLESS LEG SYNDROME: ICD-10-CM

## 2019-01-23 RX ORDER — CARBIDOPA AND LEVODOPA 25; 100 MG/1; MG/1
TABLET, EXTENDED RELEASE ORAL
Qty: 90 TABLET | Refills: 1 | Status: SHIPPED | OUTPATIENT
Start: 2019-01-23 | End: 2019-07-22

## 2019-01-23 NOTE — TELEPHONE ENCOUNTER
"Requested Prescriptions   Pending Prescriptions Disp Refills     carbidopa-levodopa (SINEMET CR)  MG CR tablet [Pharmacy Med Name: CARBIDOPA-LEVODOPA ER  TBCR] 90 tablet 1    Last Written Prescription Date:  7/24/18  Last Fill Quantity: 90,  # refills: 1   Last office visit: 10/8/2018 with prescribing provider:  10/8/18   Future Office Visit:     Sig: TAKE ONE TABLET BY MOUTH EVERY NIGHT AT BEDTIME    Antiparkinson's Agents Protocol Passed - 1/20/2019  1:02 PM       Passed - Recent (12 mo) or future (30 days) visit within the authorizing provider's specialty    Patient had office visit in the last 12 months or has a visit in the next 30 days with authorizing provider or within the authorizing provider's specialty.  See \"Patient Info\" tab in inbasket, or \"Choose Columns\" in Meds & Orders section of the refill encounter.             Passed - Medication is active on med list       Passed - Patient is age 18 or older       Passed - No active pregnancy on record       Passed - No positive pregnancy test in the past 12 months        "

## 2019-01-23 NOTE — TELEPHONE ENCOUNTER
Prescription approved per [facility] [place].    KATLYN GarN, RN  [facility]

## 2019-04-16 ENCOUNTER — OFFICE VISIT (OUTPATIENT)
Dept: FAMILY MEDICINE | Facility: OTHER | Age: 64
End: 2019-04-16
Payer: COMMERCIAL

## 2019-04-16 VITALS
RESPIRATION RATE: 16 BRPM | TEMPERATURE: 98.1 F | DIASTOLIC BLOOD PRESSURE: 90 MMHG | WEIGHT: 102.3 LBS | SYSTOLIC BLOOD PRESSURE: 156 MMHG | HEIGHT: 63 IN | BODY MASS INDEX: 18.12 KG/M2 | HEART RATE: 76 BPM

## 2019-04-16 DIAGNOSIS — J01.00 ACUTE NON-RECURRENT MAXILLARY SINUSITIS: Primary | ICD-10-CM

## 2019-04-16 PROCEDURE — 99213 OFFICE O/P EST LOW 20 MIN: CPT | Performed by: INTERNAL MEDICINE

## 2019-04-16 RX ORDER — AZITHROMYCIN 250 MG/1
TABLET, FILM COATED ORAL
Qty: 6 TABLET | Refills: 0 | Status: SHIPPED | OUTPATIENT
Start: 2019-04-16 | End: 2019-06-28

## 2019-04-16 RX ORDER — LORATADINE 10 MG/1
10 TABLET ORAL DAILY
Qty: 90 TABLET | Refills: 0 | Status: SHIPPED | OUTPATIENT
Start: 2019-04-16 | End: 2019-12-09

## 2019-04-16 ASSESSMENT — PAIN SCALES - GENERAL: PAINLEVEL: MILD PAIN (3)

## 2019-04-16 ASSESSMENT — MIFFLIN-ST. JEOR: SCORE: 988.16

## 2019-04-16 NOTE — PROGRESS NOTES
SUBJECTIVE:   Sally Ware is a 63 year old female who presents to clinic today for the following   health issues:      Acute Illness   Acute illness concerns: sinus pressure  Onset: x 3 days    Fever: no    Chills/Sweats: YES    Headache (location?): YES    Sinus Pressure:YES    Conjunctivitis:  no    Ear Pain: YES: right    Rhinorrhea: YES    Congestion: YES    Sore Throat: no     Cough: YES-productive of green sputum    Wheeze: no    Decreased Appetite: no    Nausea: no    Vomiting: no    Diarrhea:  no    Dysuria/Freq.: no    Fatigue/Achiness: YES- fatigue    Sick/Strep Exposure: no     Therapies Tried and outcome: Claritin D                      Chief Complaint         The patient is a pleasant 63-year-old female who presents today with nasal congestion, posterior nasal drainage, frontal and maxillary pressure, bilateral ear pressure, slightly decreased hearing.  She has had no changes in her appetite and is taking food and fluid adequately.  She has had some coughing which is a result of posterior nasal drainage producing a thick green and yellow sputum.  She has been using Claritin with Sudafed for this and notes that she has not been getting a significant response.                       PAST, FAMILY,SOCIAL HISTORY:     Medical  History:   has a past medical history of Restless legs syndrome.     Surgical History:   has a past surgical history that includes no history of surgery and Colonoscopy (N/A, 9/5/2014).     Social History:   reports that she has been smoking cigarettes.  She has a 20.00 pack-year smoking history. She has never used smokeless tobacco. She reports that she drinks about 8.4 oz of alcohol per week. She reports that she does not use drugs.     Family History:  family history includes Alzheimer Disease in her mother; Arthritis in her father and mother; C.A.D. in her father; Cancer in her maternal grandfather; Heart Disease in her father; Hypertension in her  "mother.            MEDICATIONS  Current Outpatient Medications   Medication Sig Dispense Refill     augmented betamethasone dipropionate (DIPROLENE-AF) 0.05 % cream Apply sparingly to affected area twice daily as needed.  Do not apply to face. 50 g 0     azithromycin (ZITHROMAX) 250 MG tablet Take 2 tablets (500 mg) by mouth daily for 1 day, THEN 1 tablet (250 mg) daily for 4 days. 6 tablet 0     carbidopa-levodopa (SINEMET CR)  MG CR tablet TAKE ONE TABLET BY MOUTH EVERY NIGHT AT BEDTIME 90 tablet 1     loratadine (CLARITIN) 10 MG tablet Take 1 tablet (10 mg) by mouth daily 90 tablet 0         --------------------------------------------------------------------------------------------------------------------                              Review of Systems       LUNGS: Pt denies: cough, excess sputum, hemoptysis, or shortness of breath.   HEART: Pt denies: chest pain, arrhythmia, syncope, tachy or bradyarrhythmia.   GI: Pt denies: nausea, vomiting, diarrhea, constipation, melena, or hematochezia.   NEURO: Pt denies: seizures, strokes, diplopia, weakness, paraesthesias, or paralysis.   SKIN: Pt denies: itching, rashes, discoloration, or specific lesions of concern. Denies recent hair loss.   PSYCH: The patient denies significant depression, anxiety, mood imbalance. Specifically denies any suicidal ideation.                                     Examination      /90   Pulse 76   Temp 98.1  F (36.7  C) (Temporal)   Resp 16   Ht 1.6 m (5' 3\")   Wt 46.4 kg (102 lb 4.8 oz)   LMP  (LMP Unknown)   BMI 18.12 kg/m     Constitutional: The patient appears to be in no acute distress. The patient appears to be adequately hydrated. No acute respiratory or hemodynamic distress is noted at this time.   ENT: Pharynx is mildly-erythemous, marked yellow/green PND, there is significant nasal obstruction, TM's are slightly red and markedly Retracted, hearing intact bilaterally. No carotid bruits are heard. No JVD seen. " Thyroid is not nodular or enlarged.   LUNGS: clear bilaterally, airflow is brisk, no intercostal retraction or stridor is noted. No coughing is noted during visit.   HEART:  regular without rubs, clicks, gallops, or murmurs. PMI is nondisplaced. Upstrokes are brisk. S1,S2 are heard.   GI: Abdomen is soft, without rebound, guarding or tenderness. Bowel sounds are appropriate. No renal bruits are heard.   NEURO: Pt is alert and appropriate. No neurologic lateralization is noted. Cranial nerves 2-12 are intact. Peripheral sensory and motor function are grossly normal.    SKIN:  warm and dry. No erythema, or rashes are noted. No specific lesions of concern are noted.    PSYCH: The patient appears grossly appropriate. Maintains good eye contact, does not have any jittery or atypical motion. Displays appropriate affect.                                           Decision Making    1. Acute non-recurrent maxillary sinusitis  Fluids, rest, Claritin, antibiotic, Tylenol.  Start Flonase over-the-counter nasal spray upon conclusion of antibiotic.  (Allergic component)  - azithromycin (ZITHROMAX) 250 MG tablet; Take 2 tablets (500 mg) by mouth daily for 1 day, THEN 1 tablet (250 mg) daily for 4 days.  Dispense: 6 tablet; Refill: 0  - loratadine (CLARITIN) 10 MG tablet; Take 1 tablet (10 mg) by mouth daily  Dispense: 90 tablet; Refill: 0                           FOLLOW UP   I have asked the patient to make an appointment for followup with me in 6 months or as needed        I have carefully explained the diagnosis and treatment options to the patient.  The patient has displayed an understanding of the above, and all subsequent questions were answered.      DO RAYMOND Stewart    Portions of this note were produced using [employer]  Although every attempt at real-time proof reading has been made, occasional grammar/syntax errors may have been missed.

## 2019-06-28 ENCOUNTER — OFFICE VISIT (OUTPATIENT)
Dept: FAMILY MEDICINE | Facility: OTHER | Age: 64
End: 2019-06-28
Payer: COMMERCIAL

## 2019-06-28 VITALS
DIASTOLIC BLOOD PRESSURE: 86 MMHG | SYSTOLIC BLOOD PRESSURE: 138 MMHG | TEMPERATURE: 98.8 F | OXYGEN SATURATION: 100 % | RESPIRATION RATE: 14 BRPM | HEART RATE: 98 BPM | BODY MASS INDEX: 17.89 KG/M2 | WEIGHT: 101 LBS

## 2019-06-28 DIAGNOSIS — F17.200 TOBACCO USE DISORDER: ICD-10-CM

## 2019-06-28 DIAGNOSIS — M26.609 TMJ (TEMPOROMANDIBULAR JOINT SYNDROME): Primary | ICD-10-CM

## 2019-06-28 PROCEDURE — 99213 OFFICE O/P EST LOW 20 MIN: CPT | Performed by: INTERNAL MEDICINE

## 2019-06-28 RX ORDER — MELOXICAM 15 MG/1
15 TABLET ORAL DAILY
Qty: 30 TABLET | Refills: 0 | Status: SHIPPED | OUTPATIENT
Start: 2019-06-28 | End: 2019-10-30

## 2019-06-28 ASSESSMENT — PAIN SCALES - GENERAL: PAINLEVEL: NO PAIN (0)

## 2019-06-28 NOTE — PROGRESS NOTES
"Subjective     Sally Ware is a 63 year old female who presents to clinic today for the following health issues:    HPI   Chief Complaint   Patient presents with     Otalgia     feels plugged, pain sometimes, balance is off                         Chief Complaint         The patient is a pleasant 63-year-old female who presents today with a 4-month history of right ear pain.  She questions the possibility of an infection.  She has had some mild intermittent vertigo from time to time but nothing current.  She notes that the pain is sharp and stabbing and appears to be in the ear canal itself.  She is had no discharge or drainage, she is had no false smell from the ear.  She has no adenopathy in the cervical region.  She has a concurrent history of tobacco use.  I have recommended smoking cessation, she is going to \"look into it\".                         PAST, FAMILY,SOCIAL HISTORY:     Medical  History:   has a past medical history of Restless legs syndrome.     Surgical History:   has a past surgical history that includes no history of surgery and Colonoscopy (N/A, 9/5/2014).     Social History:   reports that she has been smoking cigarettes.  She has a 20.00 pack-year smoking history. She has never used smokeless tobacco. She reports that she drinks about 8.4 oz of alcohol per week. She reports that she does not use drugs.     Family History:  family history includes Alzheimer Disease in her mother; Arthritis in her father and mother; C.A.D. in her father; Cancer in her maternal grandfather; Heart Disease in her father; Hypertension in her mother.            MEDICATIONS  Current Outpatient Medications   Medication Sig Dispense Refill     augmented betamethasone dipropionate (DIPROLENE-AF) 0.05 % cream Apply sparingly to affected area twice daily as needed.  Do not apply to face. 50 g 0     carbidopa-levodopa (SINEMET CR)  MG CR tablet TAKE ONE TABLET BY MOUTH EVERY NIGHT AT BEDTIME 90 tablet 1     " loratadine (CLARITIN) 10 MG tablet Take 1 tablet (10 mg) by mouth daily 90 tablet 0     meloxicam (MOBIC) 15 MG tablet Take 1 tablet (15 mg) by mouth daily 30 tablet 0         --------------------------------------------------------------------------------------------------------------------                              Review of Systems       LUNGS: Pt denies: cough, excess sputum, hemoptysis, or shortness of breath.   HEART: Pt denies: chest pain, arrhythmia, syncope, tachy or bradyarrhythmia.   GI: Pt denies: nausea, vomiting, diarrhea, constipation, melena, or hematochezia.   NEURO: Pt denies: seizures, strokes, diplopia, weakness, paraesthesias, or paralysis.   SKIN: Pt denies: itching, rashes, discoloration, or specific lesions of concern. Denies recent hair loss.   PSYCH: The patient denies significant depression, anxiety, mood imbalance. Specifically denies any suicidal ideation.                                     Examination    /86 (BP Location: Left arm, Patient Position: Sitting, Cuff Size: Adult Regular)   Pulse 98   Temp 98.8  F (37.1  C) (Temporal)   Resp 14   Wt 45.8 kg (101 lb)   LMP  (LMP Unknown)   SpO2 100%   BMI 17.89 kg/m       Constitutional: The patient appears to be in no acute distress. The patient appears to be adequately hydrated. No acute respiratory or hemodynamic distress is noted at this time.   LUNGS: clear with occasional wheezes noted bilaterally, airflow is brisk, no intercostal retraction or stridor is noted. No coughing is noted during visit.   HEART:  regular without rubs, clicks, gallops, or murmurs. PMI is nondisplaced. Upstrokes are brisk. S1,S2 are heard.   GI: Abdomen is soft, without rebound, guarding or tenderness. Bowel sounds are appropriate. No renal bruits are heard.   NEURO: Pt is alert and appropriate. No neurologic lateralization is noted. Cranial nerves 2-12 are intact. Peripheral sensory and motor function are grossly normal.    SKIN:  warm and dry.  "No erythema, or rashes are noted. No specific lesions of concern are noted.    PSYCH: The patient appears grossly appropriate. Maintains good eye contact, does not have any jittery or atypical motion. Displays appropriate affect.   ENT: Pharynx is non-erythemous, minimal PND, no significant nasal obstruction, TM's not red or retracted, hearing intact bilaterally. No carotid bruits are heard. No JVD seen. Thyroid is not nodular or enlarged.  Significant discomfort is noted with palpation of the right temporal mandibular joint.  This reproduces her discomfort with some radiation into the ear canal and pinna as well as the mandibular area.  Modest crepitance is noted.                                           Decision Making    1. TMJ (temporomandibular joint syndrome)  Discussed the avoidance of \"forced mastication\".  Avoid gum and excessive chewing.  Moist heat when possible.  Start NSAID  - meloxicam (MOBIC) 15 MG tablet; Take 1 tablet (15 mg) by mouth daily  Dispense: 30 tablet; Refill: 0    2. Tobacco use disorder (SMOKERS')  Recommend smoking cessation.  Options are offered.                         FOLLOW UP   I have asked the patient to make an appointment for followup with me in 2 weeks if not markedly improved.  At that time, joint injection versus oral surgery consultation will be discussed.        I have carefully explained the diagnosis and treatment options to the patient.  The patient has displayed an understanding of the above, and all subsequent questions were answered.      DO RAYMOND Stewart    Portions of this note were produced using [employer]  Although every attempt at real-time proof reading has been made, occasional grammar/syntax errors may have been missed.               "

## 2019-07-22 DIAGNOSIS — G25.81 RESTLESS LEG SYNDROME: ICD-10-CM

## 2019-07-23 RX ORDER — CARBIDOPA AND LEVODOPA 25; 100 MG/1; MG/1
TABLET, EXTENDED RELEASE ORAL
Qty: 90 TABLET | Refills: 1 | Status: SHIPPED | OUTPATIENT
Start: 2019-07-23 | End: 2019-12-09

## 2019-07-23 NOTE — TELEPHONE ENCOUNTER
"Requested Prescriptions   Pending Prescriptions Disp Refills     carbidopa-levodopa (SINEMET CR)  MG CR tablet [Pharmacy Med Name: CARBIDOPA-LEVODOPA ER  TBCR] 90 tablet 1     Sig: TAKE ONE TABLET BY MOUTH EVERY NIGHT AT BEDTIME   Last Written Prescription Date:  1/23/19  Last Fill Quantity: 90,  # refills: 1   Last office visit: 6/28/2019 with prescribing provider:  6/28/19   Future Office Visit:        [employer] Protocol Passed - 7/22/2019 10:43 AM        Passed - Recent (12 mo) or future (30 days) visit within the authorizing provider's specialty     Patient had office visit in the last 12 months or has a visit in the next 30 days with authorizing provider or within the authorizing provider's specialty.  See \"Patient Info\" tab in inbasket, or \"Choose Columns\" in Meds & Orders section of the refill encounter.              Passed - Medication is active on med list        Passed - Patient is age 18 or older        Passed - No active pregnancy on record        Passed - No positive pregnancy test in the past 12 months        "

## 2019-07-23 NOTE — TELEPHONE ENCOUNTER
Prescription approved per [facility] Refill Protocol.    KATLYN GarN, RN  [facility]

## 2019-10-15 ENCOUNTER — OFFICE VISIT (OUTPATIENT)
Dept: FAMILY MEDICINE | Facility: OTHER | Age: 64
End: 2019-10-15
Payer: COMMERCIAL

## 2019-10-15 ENCOUNTER — OFFICE VISIT (OUTPATIENT)
Dept: AUDIOLOGY | Facility: CLINIC | Age: 64
End: 2019-10-15
Attending: INTERNAL MEDICINE
Payer: COMMERCIAL

## 2019-10-15 VITALS
DIASTOLIC BLOOD PRESSURE: 95 MMHG | HEART RATE: 98 BPM | SYSTOLIC BLOOD PRESSURE: 175 MMHG | OXYGEN SATURATION: 96 % | WEIGHT: 107 LBS | RESPIRATION RATE: 16 BRPM | BODY MASS INDEX: 18.95 KG/M2 | TEMPERATURE: 98.7 F

## 2019-10-15 DIAGNOSIS — I10 ESSENTIAL HYPERTENSION: ICD-10-CM

## 2019-10-15 DIAGNOSIS — H90.A31 MIXED CONDUCTIVE AND SENSORINEURAL HEARING LOSS OF RIGHT EAR WITH RESTRICTED HEARING OF LEFT EAR: ICD-10-CM

## 2019-10-15 DIAGNOSIS — L20.9 ATOPIC DERMATITIS, UNSPECIFIED TYPE: ICD-10-CM

## 2019-10-15 DIAGNOSIS — H91.90 HEARING LOSS, UNSPECIFIED HEARING LOSS TYPE, UNSPECIFIED LATERALITY: ICD-10-CM

## 2019-10-15 DIAGNOSIS — H90.3 SENSORINEURAL HEARING LOSS, BILATERAL: Primary | ICD-10-CM

## 2019-10-15 DIAGNOSIS — Z23 NEED FOR PROPHYLACTIC VACCINATION AND INOCULATION AGAINST INFLUENZA: Primary | ICD-10-CM

## 2019-10-15 PROCEDURE — 90471 IMMUNIZATION ADMIN: CPT | Performed by: INTERNAL MEDICINE

## 2019-10-15 PROCEDURE — 99207 ZZC NO CHARGE LOS: CPT | Performed by: AUDIOLOGIST

## 2019-10-15 PROCEDURE — 90682 RIV4 VACC RECOMBINANT DNA IM: CPT | Performed by: INTERNAL MEDICINE

## 2019-10-15 PROCEDURE — 92557 COMPREHENSIVE HEARING TEST: CPT | Performed by: AUDIOLOGIST

## 2019-10-15 PROCEDURE — 92550 TYMPANOMETRY & REFLEX THRESH: CPT | Performed by: AUDIOLOGIST

## 2019-10-15 PROCEDURE — 99214 OFFICE O/P EST MOD 30 MIN: CPT | Mod: 25 | Performed by: INTERNAL MEDICINE

## 2019-10-15 RX ORDER — LISINOPRIL 10 MG/1
10 TABLET ORAL DAILY
Qty: 30 TABLET | Refills: 0 | Status: SHIPPED | OUTPATIENT
Start: 2019-10-15 | End: 2019-10-30

## 2019-10-15 RX ORDER — BETAMETHASONE DIPROPIONATE 0.5 MG/G
CREAM TOPICAL
Qty: 50 G | Refills: 0 | Status: SHIPPED | OUTPATIENT
Start: 2019-10-15

## 2019-10-15 ASSESSMENT — PAIN SCALES - GENERAL: PAINLEVEL: NO PAIN (0)

## 2019-10-15 NOTE — PROGRESS NOTES
AUDIOLOGY REPORT    SUBJECTIVE:  Sally Ware is a 63 year old female who was seen in the Audiology Clinic at the [facility] for audiologic evaluation, referred by Hugo Carroll D.O. The patient reports onset of decreased hearing right ear, felt like she couldn't clear her right ear after flying has not resolved.  She reported brief episodes of tonal tinnitus right ear and reported onset of balance problems in the last couple of months (needs to use support in and out of the shower which is new). They were unaccompanied today.    OBJECTIVE:  Otoscopic exam indicates ears are clear of cerumen bilaterally.     Pure Tone Thresholds assessed using conventional audiometry with good  reliability from 250-8000 Hz bilaterally using insert earphones     RIGHT:  severe mixed hearing loss rising to mild sensorineural hearing loss rising to hearing in the normal range (bone better than air) then mild to severe high frequency hearing loss     LEFT:    mild low frequency sensorineural hearing loss rising to normal hearing with mild to moderate high frequency hearing loss     Tympanogram:    RIGHT: normal eardrum mobility    LEFT:   Bifurcated tympanogram, replicated 3 times, consistent with abnormal middle ear function    Reflexes (reported by stimulus ear):  RIGHT: Ipsilateral is present at normal levels  RIGHT: Contralateral is absent at frequencies tested  LEFT:   Ipsilateral is present at normal levels  LEFT:   Contralateral is present at normal levels      Speech Reception Threshold:    RIGHT: 45 dB HL    LEFT:   15 dB HL  Word Recognition Score:     RIGHT: 84% at 85 dB HL using NU-6 recorded word list.    LEFT:   100% at 55 dB HL using NU-6 recorded word list.      ASSESSMENT:     ICD-10-CM    1. Sensorineural hearing loss, bilateral H90.3 COMPREHENSIVE HEARING TEST     TYMPANOMETRY AND REFLEX THRESHOLD MEASUREMENTS   2. Mixed conductive and sensorineural hearing loss of right  ear with restricted hearing of left ear H90.A31 COMPREHENSIVE HEARING TEST     TYMPANOMETRY AND REFLEX THRESHOLD MEASUREMENTS       Today s results were discussed with the patient in detail.     PLAN:   It is recommended that the patient schedule an ENT consult with Dr. Us regarding asymmetric hearing loss right ear worse than left ear.  Please call this clinic with questions regarding these results or recommendations.        Unique Perrin Licensed Audiologist #[id number]

## 2019-10-15 NOTE — PROGRESS NOTES
"Chief Complaint   Patient presents with     Ear Problem     right ear pain for \"months\"; cannot hear out of it                     Chief Complaint         The patient is a pleasant 63-year-old female who notes that she is had substantial decrease in the hearing from her right ear for the last several months.  She notes that she can hear but not very well.  The contralateral ear is reported as \"normal\".  She had no trauma or injury to the ear.  She was also noted to have a markedly elevated blood pressure today in the office.  She denies any headaches, chest pain, lightheadedness, or other neurologic symptoms.  Previous blood pressures are reviewed and have been tending toward borderline high.  Today, on multiple rechecks she is well over the border.                         PAST, FAMILY,SOCIAL HISTORY:     Medical  History:   has a past medical history of Restless legs syndrome.     Surgical History:   has a past surgical history that includes no history of surgery and Colonoscopy (N/A, 9/5/2014).     Social History:   reports that she quit smoking about 6 weeks ago. Her smoking use included cigarettes. She has a 20.00 pack-year smoking history. She has never used smokeless tobacco. She reports current alcohol use of about 14.0 standard drinks of alcohol per week. She reports that she does not use drugs.     Family History:  family history includes Alzheimer Disease in her mother; Arthritis in her father and mother; C.A.D. in her father; Cancer in her maternal grandfather; Heart Disease in her father; Hypertension in her mother.            MEDICATIONS  Current Outpatient Medications   Medication Sig Dispense Refill     augmented betamethasone dipropionate (DIPROLENE-AF) 0.05 % external cream Apply sparingly to affected area twice daily as needed.  Do not apply to face. 50 g 0     carbidopa-levodopa (SINEMET CR)  MG CR tablet TAKE ONE TABLET BY MOUTH EVERY NIGHT AT BEDTIME 90 tablet 1     lisinopril " (PRINIVIL/ZESTRIL) 10 MG tablet Take 1 tablet (10 mg) by mouth daily 30 tablet 0     loratadine (CLARITIN) 10 MG tablet Take 1 tablet (10 mg) by mouth daily 90 tablet 0     meloxicam (MOBIC) 15 MG tablet Take 1 tablet (15 mg) by mouth daily (Patient not taking: Reported on 10/15/2019) 30 tablet 0         --------------------------------------------------------------------------------------------------------------------                              Review of Systems       LUNGS: Pt denies: cough, excess sputum, hemoptysis, or shortness of breath.   HEART: Pt denies: chest pain, arrhythmia, syncope, tachy or bradyarrhythmia.   GI: Pt denies: nausea, vomiting, diarrhea, constipation, melena, or hematochezia.   NEURO: Pt denies: seizures, strokes, diplopia, weakness, paraesthesias, or paralysis.   SKIN: Pt denies: itching, rashes, discoloration, or specific lesions of concern. Denies recent hair loss.   PSYCH: The patient denies significant depression, anxiety, mood imbalance. Specifically denies any suicidal ideation.                                     Examination    BP (!) 175/95   Pulse 98   Temp 98.7  F (37.1  C) (Temporal)   Resp 16   Wt 48.5 kg (107 lb)   LMP  (LMP Unknown)   SpO2 96%   BMI 18.95 kg/m     Constitutional: The patient appears to be in no acute distress. The patient appears to be adequately hydrated. No acute respiratory or hemodynamic distress is noted at this time.   LUNGS: clear bilaterally, airflow is brisk, no intercostal retraction or stridor is noted. No coughing is noted during visit.   HEART:  regular without rubs, clicks, gallops, or murmurs. PMI is nondisplaced. Upstrokes are brisk. S1,S2 are heard.   GI: Abdomen is soft, without rebound, guarding or tenderness. Bowel sounds are appropriate. No renal bruits are heard.   NEURO: Pt is alert and appropriate. No neurologic lateralization is noted. Cranial nerves 2-12 are intact. Peripheral sensory and motor function are grossly normal.     SKIN:  warm and dry. No erythema, or rashes are noted. No specific lesions of concern are noted.    PSYCH: The patient appears grossly appropriate. Maintains good eye contact, does not have any jittery or atypical motion. Displays appropriate affect.   ENT: Pharynx is non-erythemous, minimal PND, no significant nasal obstruction, TM's not red or retracted, hearing decreased more on the right than left . No carotid bruits are heard. No JVD seen. Thyroid is not nodular or enlarged.                                          Decision Making  1. Atopic dermatitis, unspecified type  Continue with topical steroid as needed to dorsal aspect of hands  - augmented betamethasone dipropionate (DIPROLENE-AF) 0.05 % external cream; Apply sparingly to affected area twice daily as needed.  Do not apply to face.  Dispense: 50 g; Refill: 0    2. Hearing loss, unspecified hearing loss type, unspecified laterality  Set up with audiology for evaluation  - AUDIOLOGY ADULT REFERRAL    3. Essential hypertension  Initiate lisinopril  - lisinopril (PRINIVIL/ZESTRIL) 10 MG tablet; Take 1 tablet (10 mg) by mouth daily  Dispense: 30 tablet; Refill: 0                           FOLLOW UP   I have asked the patient to make an appointment for followup with me in 3 weeks for blood pressure check and lab work        I have carefully explained the diagnosis and treatment options to the patient.  The patient has displayed an understanding of the above, and all subsequent questions were answered.      DO RAYMOND Stewart    Portions of this note were produced using [employer]  Although every attempt at real-time proof reading has been made, occasional grammar/syntax errors may have been missed.

## 2019-10-22 ENCOUNTER — ANCILLARY PROCEDURE (OUTPATIENT)
Dept: MAMMOGRAPHY | Facility: OTHER | Age: 64
End: 2019-10-22
Attending: INTERNAL MEDICINE
Payer: COMMERCIAL

## 2019-10-22 DIAGNOSIS — Z12.31 VISIT FOR SCREENING MAMMOGRAM: ICD-10-CM

## 2019-10-22 PROCEDURE — 77067 SCR MAMMO BI INCL CAD: CPT | Mod: TC

## 2019-10-28 NOTE — PROGRESS NOTES
ENT Consultation    Sally Ware who is a 63 year old female seen in consultation at the request of self.      History of Present Illness - Sally Ware is a 63 year old female presents with a history of multiple sudden hearing loss involving her right ear.  It started in March when she was flying ear gets plugged.  She was treated with parenteral antibiotic for possible otitis media.  Finally did get a unplugged after months but the hearing never came back.  Even though she had no vertigo at the time but now has some disequilibrium but not vertigo off-and-on disequilibrium is more frequent now.  Denies any headaches any migraine history.  Denies any visual acuity changes.    The patient   There is no height or weight on file to calculate BMI.        BP Readings from Last 1 Encounters:   10/15/19 (!) 175/95       BP noted to be elevated today in office.  Patient to follow up with Primary Care provider regarding elevated blood pressure.    Sally IS NOT a smoker/uses chewing tobacco.  Sally already quit      Past Medical History -   Past Medical History:   Diagnosis Date     Restless legs syndrome        Current Medications -   Current Outpatient Medications:      augmented betamethasone dipropionate (DIPROLENE-AF) 0.05 % external cream, Apply sparingly to affected area twice daily as needed.  Do not apply to face., Disp: 50 g, Rfl: 0     carbidopa-levodopa (SINEMET CR)  MG CR tablet, TAKE ONE TABLET BY MOUTH EVERY NIGHT AT BEDTIME, Disp: 90 tablet, Rfl: 1     lisinopril (PRINIVIL/ZESTRIL) 10 MG tablet, Take 1 tablet (10 mg) by mouth daily, Disp: 30 tablet, Rfl: 0     loratadine (CLARITIN) 10 MG tablet, Take 1 tablet (10 mg) by mouth daily, Disp: 90 tablet, Rfl: 0     meloxicam (MOBIC) 15 MG tablet, Take 1 tablet (15 mg) by mouth daily (Patient not taking: Reported on 10/15/2019), Disp: 30 tablet, Rfl: 0    Allergies - No Known Allergies    Social History -   Social History      Socioeconomic History     Marital status: Single     Spouse name: Not on file     Number of children: Not on file     Years of education: Not on file     Highest education level: Not on file   Occupational History     Not on file   Social Needs     Financial resource strain: Not on file     Food insecurity:     Worry: Not on file     Inability: Not on file     Transportation needs:     Medical: Not on file     Non-medical: Not on file   Tobacco Use     Smoking status: Former Smoker     Packs/day: 0.50     Years: 40.00     Pack years: 20.00     Types: Cigarettes     Last attempt to quit: 2019     Years since quittin.1     Smokeless tobacco: Never Used   Substance and Sexual Activity     Alcohol use: Yes     Alcohol/week: 14.0 standard drinks     Types: 14 Standard drinks or equivalent per week     Comment: 2 drinks per day     Drug use: No     Sexual activity: Yes     Partners: Male   Lifestyle     Physical activity:     Days per week: Not on file     Minutes per session: Not on file     Stress: Not on file   Relationships     Social connections:     Talks on phone: Not on file     Gets together: Not on file     Attends [employer] service: Not on file     Active member of club or organization: Not on file     Attends meetings of clubs or organizations: Not on file     Relationship status: Not on file     Intimate partner violence:     Fear of current or ex partner: Not on file     Emotionally abused: Not on file     Physically abused: Not on file     Forced sexual activity: Not on file   Other Topics Concern     Parent/sibling w/ CABG, MI or angioplasty before 65F 55M? Not Asked   Social History Narrative     Not on file       Family History -   Family History   Problem Relation Age of Onset     Alzheimer Disease Mother         dementia     Arthritis Mother      Hypertension Mother      C.A.D. Father      Heart Disease Father      Arthritis Father         RA     Cancer Maternal Grandfather         Brain  Tumor       Review of Systems - As per HPI and PMHx, otherwise review of system review of the head and neck negative. Otherwise 10+ review of system is negative    Physical Exam  LMP  (LMP Unknown)   BMI: There is no height or weight on file to calculate BMI.    General - The patient is well nourished and well developed, and appears to have good nutritional status.  Alert and oriented to person and place, answers questions and cooperates with examination appropriately.    SKIN - No suspicious lesions or rashes.  Respiration - No respiratory distress.  Head and Face - Normocephalic and atraumatic, with no gross asymmetry noted of the contour of the facial features.  The facial nerve is intact, with strong symmetric movements.    Voice and Breathing - The patient was breathing comfortably without the use of accessory muscles. The patients voice was clear and strong, and had appropriate pitch and quality.    Ears - Bilateral pinna and EACs with normal appearing overlying skin. Tympanic membrane intact with good mobility on pneumatic otoscopy bilaterally. Bony landmarks of the ossicular chain are normal. The tympanic membranes are normal in appearance. No retraction, perforation, or masses.  No fluid or purulence was seen in the external canal or the middle ear.     Eyes - Extraocular movements intact.  Sclera were not icteric or injected, conjunctiva were pink and moist.    Mouth - Examination of the oral cavity showed pink, healthy oral mucosa. No lesions or ulcerations noted.  The tongue was mobile and midline, and the dentition were in good condition.      Throat - The walls of the oropharynx were smooth, pink, moist, symmetric, and had no lesions or ulcerations.  The tonsillar pillars and soft palate were symmetric.  The uvula was midline on elevation.    Neck - Normal midline excursion of the laryngotracheal complex during swallowing.  Full range of motion on passive movement.  Palpation of the occipital,  submental, submandibular, internal jugular chain, and supraclavicular nodes did not demonstrate any abnormal lymph nodes or masses.  The carotid pulse was palpable bilaterally.  Palpation of the thyroid was soft and smooth, with no nodules or goiter appreciated.  The trachea was mobile and midline.    Nose - External contour is symmetric, no gross deflection or scars.  Nasal mucosa is pink and moist with no abnormal mucus.  The septum was midline and non-obstructive, turbinates of normal size and position.  No polyps, masses, or purulence noted on examination.    Neuro - Nonfocal neuro exam is normal, CN 2 through 12 intact, normal gait and muscle tone.      Performed in clinic today:  Audiologic Studies - An audiogram and tympanogram were performed today as part of the evaluation and personally reviewed. The tympanogram shows Type C curves on the right and Type D curves on the left, with Normal canal volumes and middle ear pressures.  The audiogram showed Severe low frequency sensorineural loss coming up to normal levels at high frequencies and then declining again at around 8000 Hz to severe sloping loss on the right and Mild sensorineural losson the left.  Word recognition score 84% on the right versus 100 and the left.  Tuning fork testing at 256 and 512 Hz shows AC greater than BC bilaterally.      A/P - Sally Ware is a 63 year old female with what appears to possibly sudden sensorineural hearing loss possible as a result of injury trauma during flight.  Unfortunately already has been since March and therefore no medical therapy would be efficacious.  Couple of issues to be addressed as well and further evaluation of sudden loss of MRI plus MRI would of the brain also would be evaluated balance disorder further.  Also would like to send patient to the [place] [facility] for further evaluation of the balance disorder.  Patient will see me back in a month.      Norman Us MD

## 2019-10-30 ENCOUNTER — OFFICE VISIT (OUTPATIENT)
Dept: FAMILY MEDICINE | Facility: OTHER | Age: 64
End: 2019-10-30
Payer: COMMERCIAL

## 2019-10-30 VITALS
HEART RATE: 76 BPM | DIASTOLIC BLOOD PRESSURE: 84 MMHG | TEMPERATURE: 98 F | OXYGEN SATURATION: 97 % | BODY MASS INDEX: 19.13 KG/M2 | RESPIRATION RATE: 20 BRPM | SYSTOLIC BLOOD PRESSURE: 132 MMHG | WEIGHT: 108 LBS

## 2019-10-30 DIAGNOSIS — I10 ESSENTIAL HYPERTENSION: ICD-10-CM

## 2019-10-30 DIAGNOSIS — G25.81 RESTLESS LEG SYNDROME: Primary | ICD-10-CM

## 2019-10-30 PROCEDURE — 99213 OFFICE O/P EST LOW 20 MIN: CPT | Performed by: INTERNAL MEDICINE

## 2019-10-30 RX ORDER — LISINOPRIL 10 MG/1
10 TABLET ORAL DAILY
Qty: 30 TABLET | Refills: 0 | Status: SHIPPED | OUTPATIENT
Start: 2019-10-30 | End: 2019-12-09

## 2019-10-30 ASSESSMENT — PAIN SCALES - GENERAL: PAINLEVEL: NO PAIN (0)

## 2019-10-30 NOTE — PROGRESS NOTES
Subjective     Sally Ware is a 63 year old female who presents to clinic today for the following health issues:    HPI   Hypertension Follow-up      Do you check your blood pressure regularly outside of the clinic? Yes     Are you following a low salt diet? Yes    Are your blood pressures ever more than 140 on the top number (systolic) OR more   than 90 on the bottom number (diastolic), for example 140/90? No                  Chief Complaint           Patient is a pleasant 64-year-old female who presents today for follow-up of her hypertension.  Blood pressure today is 132/84.  She denies any chest pain, shortness of breath or headache.  She does check her blood pressures occasionally outside of the clinic values are generally well controlled.  She is currently taking 10 mg lisinopril daily.  She is had no cough or other symptoms from the medication.  She does have a history of some restless leg syndrome for which she takes 1 Sinemet at bedtime and notes this does give her good relief.                       PAST, FAMILY,SOCIAL HISTORY:     Medical  History:   has a past medical history of Restless legs syndrome.     Surgical History:   has a past surgical history that includes no history of surgery and Colonoscopy (N/A, 9/5/2014).     Social History:   reports that she quit smoking about 3 months ago. Her smoking use included cigarettes. She has a 20.00 pack-year smoking history. She has never used smokeless tobacco. She reports current alcohol use of about 14.0 standard drinks of alcohol per week. She reports that she does not use drugs.     Family History:  family history includes Alzheimer Disease in her mother; Arthritis in her father and mother; C.A.D. in her father; Cancer in her maternal grandfather; Heart Disease in her father; Hypertension in her mother.            MEDICATIONS  Current Outpatient Medications   Medication Sig Dispense Refill     augmented betamethasone dipropionate (DIPROLENE-AF)  0.05 % external cream Apply sparingly to affected area twice daily as needed.  Do not apply to face. 50 g 0     carbidopa-levodopa (SINEMET CR)  MG CR tablet TAKE ONE TABLET BY MOUTH EVERY NIGHT AT BEDTIME 90 tablet 1     lisinopril (PRINIVIL/ZESTRIL) 10 MG tablet Take 1 tablet (10 mg) by mouth daily 30 tablet 0     loratadine (CLARITIN) 10 MG tablet Take 1 tablet (10 mg) by mouth daily 90 tablet 0         --------------------------------------------------------------------------------------------------------------------                              Review of Systems       LUNGS: Pt denies: cough, excess sputum, hemoptysis, or shortness of breath.   HEART: Pt denies: chest pain, arrhythmia, syncope, tachy or bradyarrhythmia.   GI: Pt denies: nausea, vomiting, diarrhea, constipation, melena, or hematochezia.   NEURO: Pt denies: seizures, strokes, diplopia, weakness, paraesthesias, or paralysis.   SKIN: Pt denies: itching, rashes, discoloration, or specific lesions of concern. Denies recent hair loss.   PSYCH: The patient denies significant depression, anxiety, mood imbalance. Specifically denies any suicidal ideation.                                     Examination    /84 (BP Location: Right arm, Patient Position: Sitting, Cuff Size: Adult Regular)   Pulse 76   Temp 98  F (36.7  C) (Temporal)   Resp 20   Wt 49 kg (108 lb)   LMP  (LMP Unknown)   SpO2 97%   BMI 19.13 kg/m       Constitutional: The patient appears to be in no acute distress. The patient appears to be adequately hydrated. No acute respiratory or hemodynamic distress is noted at this time.   LUNGS: clear bilaterally, airflow is brisk, no intercostal retraction or stridor is noted. No coughing is noted during visit.   HEART:  regular without rubs, clicks, gallops, or murmurs. PMI is nondisplaced. Upstrokes are brisk. S1,S2 are heard.   GI: Abdomen is soft, without rebound, guarding or tenderness. Bowel sounds are appropriate. No renal  bruits are heard.   NEURO: Pt is alert and appropriate. No neurologic lateralization is noted. Cranial nerves 2-12 are intact. Peripheral sensory and motor function are grossly normal.    SKIN:  warm and dry. No erythema, or rashes are noted. No specific lesions of concern are noted.    PSYCH: The patient appears grossly appropriate. Maintains good eye contact, does not have any jittery or atypical motion. Displays appropriate affect.                                           Decision Making    1. Essential hypertension  Currently controlled with lisinopril.  Continue medication and follow laboratory  - lisinopril (PRINIVIL/ZESTRIL) 10 MG tablet; Take 1 tablet (10 mg) by mouth daily  Dispense: 30 tablet; Refill: 0    2. Restless leg syndrome  Respond to Sinemet, continue to use as needed                        FOLLOW UP   I have asked the patient to make an appointment for followup with me in 1 month for blood pressure check        I have carefully explained the diagnosis and treatment options to the patient.  The patient has displayed an understanding of the above, and all subsequent questions were answered.        DO RAYMOND Stewart    Portions of this note were produced using [employer]  Although every attempt at real-time proof reading has been made, occasional grammar/syntax errors may have been missed.

## 2019-11-04 ENCOUNTER — OFFICE VISIT (OUTPATIENT)
Dept: OTOLARYNGOLOGY | Facility: CLINIC | Age: 64
End: 2019-11-04
Payer: COMMERCIAL

## 2019-11-04 VITALS
DIASTOLIC BLOOD PRESSURE: 70 MMHG | HEIGHT: 62 IN | SYSTOLIC BLOOD PRESSURE: 122 MMHG | WEIGHT: 108 LBS | BODY MASS INDEX: 19.88 KG/M2

## 2019-11-04 DIAGNOSIS — H91.21 SUDDEN HEARING LOSS, RIGHT: Primary | ICD-10-CM

## 2019-11-04 DIAGNOSIS — R42 DIZZINESS: ICD-10-CM

## 2019-11-04 PROCEDURE — 99204 OFFICE O/P NEW MOD 45 MIN: CPT | Performed by: OTOLARYNGOLOGY

## 2019-11-04 ASSESSMENT — MIFFLIN-ST. JEOR: SCORE: 998.13

## 2019-11-04 NOTE — LETTER
11/4/2019         RE: Sally Ware  [address] [facility] [address]        Dear Colleague,    Thank you for referring your patient, Sally Ware, to the [facility]. Please see a copy of my visit note below.    ENT Consultation    Sally Ware who is a 63 year old female seen in consultation at the request of self.      History of Present Illness - Sally Ware is a 63 year old female presents with a history of multiple sudden hearing loss involving her right ear.  It started in March when she was flying ear gets plugged.  She was treated with parenteral antibiotic for possible otitis media.  Finally did get a unplugged after months but the hearing never came back.  Even though she had no vertigo at the time but now has some disequilibrium but not vertigo off-and-on disequilibrium is more frequent now.  Denies any headaches any migraine history.  Denies any visual acuity changes.    The patient   There is no height or weight on file to calculate BMI.        BP Readings from Last 1 Encounters:   10/15/19 (!) 175/95       BP noted to be elevated today in office.  Patient to follow up with Primary Care provider regarding elevated blood pressure.    Sally IS NOT a smoker/uses chewing tobacco.  Sally already quit      Past Medical History -   Past Medical History:   Diagnosis Date     Restless legs syndrome        Current Medications -   Current Outpatient Medications:      augmented betamethasone dipropionate (DIPROLENE-AF) 0.05 % external cream, Apply sparingly to affected area twice daily as needed.  Do not apply to face., Disp: 50 g, Rfl: 0     carbidopa-levodopa (SINEMET CR)  MG CR tablet, TAKE ONE TABLET BY MOUTH EVERY NIGHT AT BEDTIME, Disp: 90 tablet, Rfl: 1     lisinopril (PRINIVIL/ZESTRIL) 10 MG tablet, Take 1 tablet (10 mg) by mouth daily, Disp: 30 tablet, Rfl: 0     loratadine (CLARITIN) 10 MG tablet, Take 1 tablet (10 mg) by mouth daily, Disp: 90  tablet, Rfl: 0     meloxicam (MOBIC) 15 MG tablet, Take 1 tablet (15 mg) by mouth daily (Patient not taking: Reported on 10/15/2019), Disp: 30 tablet, Rfl: 0    Allergies - No Known Allergies    Social History -   Social History     Socioeconomic History     Marital status: Single     Spouse name: Not on file     Number of children: Not on file     Years of education: Not on file     Highest education level: Not on file   Occupational History     Not on file   Social Needs     Financial resource strain: Not on file     Food insecurity:     Worry: Not on file     Inability: Not on file     Transportation needs:     Medical: Not on file     Non-medical: Not on file   Tobacco Use     Smoking status: Former Smoker     Packs/day: 0.50     Years: 40.00     Pack years: 20.00     Types: Cigarettes     Last attempt to quit: 2019     Years since quittin.1     Smokeless tobacco: Never Used   Substance and Sexual Activity     Alcohol use: Yes     Alcohol/week: 14.0 standard drinks     Types: 14 Standard drinks or equivalent per week     Comment: 2 drinks per day     Drug use: No     Sexual activity: Yes     Partners: Male   Lifestyle     Physical activity:     Days per week: Not on file     Minutes per session: Not on file     Stress: Not on file   Relationships     Social connections:     Talks on phone: Not on file     Gets together: Not on file     Attends W[employer] service: Not on file     Active member of club or organization: Not on file     Attends meetings of clubs or organizations: Not on file     Relationship status: Not on file     Intimate partner violence:     Fear of current or ex partner: Not on file     Emotionally abused: Not on file     Physically abused: Not on file     Forced sexual activity: Not on file   Other Topics Concern     Parent/sibling w/ CABG, MI or angioplasty before 65F 55M? Not Asked   Social History Narrative     Not on file       Family History -   Family History   Problem Relation  Age of Onset     Alzheimer Disease Mother         dementia     Arthritis Mother      Hypertension Mother      C.A.D. Father      Heart Disease Father      Arthritis Father         RA     Cancer Maternal Grandfather         Brain Tumor       Review of Systems - As per HPI and PMHx, otherwise review of system review of the head and neck negative. Otherwise 10+ review of system is negative    Physical Exam  LMP  (LMP Unknown)   BMI: There is no height or weight on file to calculate BMI.    General - The patient is well nourished and well developed, and appears to have good nutritional status.  Alert and oriented to person and place, answers questions and cooperates with examination appropriately.    SKIN - No suspicious lesions or rashes.  Respiration - No respiratory distress.  Head and Face - Normocephalic and atraumatic, with no gross asymmetry noted of the contour of the facial features.  The facial nerve is intact, with strong symmetric movements.    Voice and Breathing - The patient was breathing comfortably without the use of accessory muscles. The patients voice was clear and strong, and had appropriate pitch and quality.    Ears - Bilateral pinna and EACs with normal appearing overlying skin. Tympanic membrane intact with good mobility on pneumatic otoscopy bilaterally. Bony landmarks of the ossicular chain are normal. The tympanic membranes are normal in appearance. No retraction, perforation, or masses.  No fluid or purulence was seen in the external canal or the middle ear.     Eyes - Extraocular movements intact.  Sclera were not icteric or injected, conjunctiva were pink and moist.    Mouth - Examination of the oral cavity showed pink, healthy oral mucosa. No lesions or ulcerations noted.  The tongue was mobile and midline, and the dentition were in good condition.      Throat - The walls of the oropharynx were smooth, pink, moist, symmetric, and had no lesions or ulcerations.  The tonsillar pillars and  soft palate were symmetric.  The uvula was midline on elevation.    Neck - Normal midline excursion of the laryngotracheal complex during swallowing.  Full range of motion on passive movement.  Palpation of the occipital, submental, submandibular, internal jugular chain, and supraclavicular nodes did not demonstrate any abnormal lymph nodes or masses.  The carotid pulse was palpable bilaterally.  Palpation of the thyroid was soft and smooth, with no nodules or goiter appreciated.  The trachea was mobile and midline.    Nose - External contour is symmetric, no gross deflection or scars.  Nasal mucosa is pink and moist with no abnormal mucus.  The septum was midline and non-obstructive, turbinates of normal size and position.  No polyps, masses, or purulence noted on examination.    Neuro - Nonfocal neuro exam is normal, CN 2 through 12 intact, normal gait and muscle tone.      Performed in clinic today:  Audiologic Studies - An audiogram and tympanogram were performed today as part of the evaluation and personally reviewed. The tympanogram shows Type C curves on the right and Type D curves on the left, with Normal canal volumes and middle ear pressures.  The audiogram showed Severe low frequency sensorineural loss coming up to normal levels at high frequencies and then declining again at around 8000 Hz to severe sloping loss on the right and Mild sensorineural losson the left.  Word recognition score 84% on the right versus 100 and the left.  Tuning fork testing at 256 and 512 Hz shows AC greater than BC bilaterally.      A/P - Sally Ware is a 63 year old female with what appears to possibly sudden sensorineural hearing loss possible as a result of injury trauma during flight.  Unfortunately already has been since March and therefore no medical therapy would be efficacious.  Couple of issues to be addressed as well and further evaluation of sudden loss of MRI plus MRI would of the brain also would be  evaluated balance disorder further.  [facility] of the balance disorder.  Patient will see me back in a month.      Norman Us MD    Again, thank you for allowing me to participate in the care of your patient.        Sincerely,        Norman Us MD, MD

## 2019-11-07 ENCOUNTER — HOSPITAL ENCOUNTER (OUTPATIENT)
Dept: MRI IMAGING | Facility: CLINIC | Age: 64
Discharge: HOME | End: 2019-11-07
Attending: OTOLARYNGOLOGY | Admitting: OTOLARYNGOLOGY
Payer: COMMERCIAL

## 2019-11-07 DIAGNOSIS — H91.21 SUDDEN HEARING LOSS, RIGHT: ICD-10-CM

## 2019-11-07 DIAGNOSIS — R42 DIZZINESS: ICD-10-CM

## 2019-11-07 PROCEDURE — 70553 MRI BRAIN STEM W/O & W/DYE: CPT

## 2019-11-07 PROCEDURE — A9585 GADOBUTROL INJECTION: HCPCS | Performed by: OTOLARYNGOLOGY

## 2019-11-07 PROCEDURE — 25500064 ZZH RX 255 OP 636: Performed by: OTOLARYNGOLOGY

## 2019-11-07 RX ORDER — GADOBUTROL 604.72 MG/ML
7.5 INJECTION INTRAVENOUS ONCE
Status: COMPLETED | OUTPATIENT
Start: 2019-11-07 | End: 2019-11-07

## 2019-11-07 RX ADMIN — GADOBUTROL 5 ML: 604.72 INJECTION INTRAVENOUS at 14:05

## 2019-11-25 ENCOUNTER — TRANSFERRED RECORDS (OUTPATIENT)
Dept: HEALTH INFORMATION MANAGEMENT | Facility: CLINIC | Age: 64
End: 2019-11-25

## 2019-11-26 NOTE — PROGRESS NOTES
History of Present Illness - Sally Ware is a 64 year old female presenting in clinic today for a recheck on Patient presents with:  RECHECK: Sudden hearing loss  Results: MRI results    Patient presents with 2 issues one is recheck in the right sudden sensorineural hearing loss word recognition score dropped to 84% versus 100% on the left side.  Second was disequilibrium no true vertigo.  Disequilibrium somewhat better but still persists.  [facility] and we still do not have the results.  In regard to her ear nothing is change in regard to the hearing.  She had MRI of the brain with gadolinium that was essentially negative.        BP Readings from Last 1 Encounters:   11/04/19 122/70       BP noted to be well controlled today in office.     Sally IS NOT a smoker/uses chewing tobacco.  Sally already quit      Past Medical History -   Past Medical History:   Diagnosis Date     Restless legs syndrome        Current Medications -   Current Outpatient Medications:      augmented betamethasone dipropionate (DIPROLENE-AF) 0.05 % external cream, Apply sparingly to affected area twice daily as needed.  Do not apply to face., Disp: 50 g, Rfl: 0     carbidopa-levodopa (SINEMET CR)  MG CR tablet, TAKE ONE TABLET BY MOUTH EVERY NIGHT AT BEDTIME, Disp: 90 tablet, Rfl: 1     lisinopril (PRINIVIL/ZESTRIL) 10 MG tablet, Take 1 tablet (10 mg) by mouth daily, Disp: 30 tablet, Rfl: 0     loratadine (CLARITIN) 10 MG tablet, Take 1 tablet (10 mg) by mouth daily, Disp: 90 tablet, Rfl: 0    Allergies - No Known Allergies    Social History -   Social History     Socioeconomic History     Marital status: Single     Spouse name: Not on file     Number of children: Not on file     Years of education: Not on file     Highest education level: Not on file   Occupational History     Not on file   Social Needs     Financial resource strain: Not on file     Food insecurity:      Worry: Not on file     Inability: Not on file     Transportation needs:     Medical: Not on file     Non-medical: Not on file   Tobacco Use     Smoking status: Former Smoker     Packs/day: 0.50     Years: 40.00     Pack years: 20.00     Types: Cigarettes     Last attempt to quit: 2019     Years since quittin.2     Smokeless tobacco: Never Used   Substance and Sexual Activity     Alcohol use: Yes     Alcohol/week: 14.0 standard drinks     Types: 14 Standard drinks or equivalent per week     Comment: 2 drinks per day     Drug use: No     Sexual activity: Yes     Partners: Male   Lifestyle     Physical activity:     Days per week: Not on file     Minutes per session: Not on file     Stress: Not on file   Relationships     Social connections:     Talks on phone: Not on file     Gets together: Not on file     Attends Mandaeism service: Not on file     Active member of club or organization: Not on file     Attends meetings of clubs or organizations: Not on file     Relationship status: Not on file     Intimate partner violence:     Fear of current or ex partner: Not on file     Emotionally abused: Not on file     Physically abused: Not on file     Forced sexual activity: Not on file   Other Topics Concern     Parent/sibling w/ CABG, MI or angioplasty before 65F 55M? Not Asked   Social History Narrative     Not on file       Family History -   Family History   Problem Relation Age of Onset     Alzheimer Disease Mother         dementia     Arthritis Mother      Hypertension Mother      C.A.D. Father      Heart Disease Father      Arthritis Father         RA     Cancer Maternal Grandfather         Brain Tumor       Review of Systems - As per HPI and PMHx, otherwise review of system review of the head and neck negative. Otherwise 10+ review of system is negative    Physical Exam  LMP  (LMP Unknown)   BMI: There is no height or weight on file to calculate BMI.    General - The patient is well nourished and well  developed, and appears to have good nutritional status.  Alert and oriented to person and place, answers questions and cooperates with examination appropriately.    SKIN - No suspicious lesions or rashes.  Respiration - No respiratory distress.  Head and Face - Normocephalic and atraumatic, with no gross asymmetry noted of the contour of the facial features.  The facial nerve is intact, with strong symmetric movements.    Voice and Breathing - The patient was breathing comfortably without the use of accessory muscles. The patients voice was clear and strong, and had appropriate pitch and quality.    Ears - Bilateral pinna and EACs with normal appearing overlying skin. Tympanic membrane intact with good mobility on pneumatic otoscopy bilaterally. Bony landmarks of the ossicular chain are normal. The tympanic membranes are normal in appearance. No retraction, perforation, or masses.  No fluid or purulence was seen in the external canal or the middle ear.     Eyes - Extraocular movements intact.  Sclera were not icteric or injected, conjunctiva were pink and moist.    Mouth - Examination of the oral cavity showed pink, healthy oral mucosa. No lesions or ulcerations noted.  The tongue was mobile and midline, and the dentition were in good condition.      Throat - The walls of the oropharynx were smooth, pink, moist, symmetric, and had no lesions or ulcerations.  The tonsillar pillars and soft palate were symmetric.  Neck - Normal midline excursion of the laryngotracheal complex during swallowing.  Full range of motion on passive movement.  Palpation of the occipital, submental, submandibular, internal jugular chain, and supraclavicular nodes did not demonstrate any abnormal lymph nodes or masses.  The carotid pulse was palpable bilaterally.  Palpation of the thyroid was soft and smooth, with no nodules or goiter appreciated.  The trachea was mobile and midline.    Nose - External contour is symmetric, no gross  deflection or scars.  Nasal mucosa is pink and moist with no abnormal mucus.  The septum was midline and non-obstructive, turbinates of normal size and position.  No polyps, masses, or purulence noted on examination.    Neuro - Nonfocal neuro exam is normal, CN 2 through 12 intact, normal gait and muscle tone.      Performed in clinic today:  No procedures preformed in clinic today      A/P - Sally Ware is a 64 year old female Patient presents with:  RECHECK: Sudden hearing loss  Results: MRI results    This plan patient reassured the patient that appears to be no intracranial pathology causing this problem.  Reviewing her audiogram from October on the right side she certainly would not be benefiting from hearing aid especially with significant low frequency loss.  She will work with audiology towards the goal.  In regard to balance I shall review the results [facility] the patient with the results.  She may benefit also from balance rehabilitation therapy.  Otherwise she will see me back in about 6 months and will repeat audiogram at that time.          Norman Us MD

## 2019-12-02 ENCOUNTER — OFFICE VISIT (OUTPATIENT)
Dept: OTOLARYNGOLOGY | Facility: CLINIC | Age: 64
End: 2019-12-02
Payer: COMMERCIAL

## 2019-12-02 VITALS
WEIGHT: 108 LBS | DIASTOLIC BLOOD PRESSURE: 82 MMHG | HEIGHT: 62 IN | SYSTOLIC BLOOD PRESSURE: 134 MMHG | BODY MASS INDEX: 19.88 KG/M2

## 2019-12-02 DIAGNOSIS — H90.3 ASYMMETRICAL SENSORINEURAL HEARING LOSS: Primary | ICD-10-CM

## 2019-12-02 DIAGNOSIS — R42 DIZZINESS: ICD-10-CM

## 2019-12-02 PROCEDURE — 99213 OFFICE O/P EST LOW 20 MIN: CPT | Performed by: OTOLARYNGOLOGY

## 2019-12-02 ASSESSMENT — MIFFLIN-ST. JEOR: SCORE: 993.13

## 2019-12-02 NOTE — LETTER
12/2/2019         RE: Sally Ware  [address] [facility] [address]        Dear Colleague,    Thank you for referring your patient, Sally Ware, to the [facility]. Please see a copy of my visit note below.    History of Present Illness - Sally Ware is a 64 year old female presenting in clinic today for a recheck on Patient presents with:  RECHECK: Sudden hearing loss  Results: MRI results    Patient presents with 2 issues one is recheck in the right sudden sensorineural hearing loss word recognition score dropped to 84% versus 100% on the left side.  Second was disequilibrium no true vertigo.  Disequilibrium somewhat better but still persists.  [facility] and we still do not have the results.  In regard to her ear nothing is change in regard to the hearing.  She had MRI of the brain with gadolinium that was essentially negative.        BP Readings from Last 1 Encounters:   11/04/19 122/70       BP noted to be well controlled today in office.     Sally IS NOT a smoker/uses chewing tobacco.  Sally already quit      Past Medical History -   Past Medical History:   Diagnosis Date     Restless legs syndrome        Current Medications -   Current Outpatient Medications:      augmented betamethasone dipropionate (DIPROLENE-AF) 0.05 % external cream, Apply sparingly to affected area twice daily as needed.  Do not apply to face., Disp: 50 g, Rfl: 0     carbidopa-levodopa (SINEMET CR)  MG CR tablet, TAKE ONE TABLET BY MOUTH EVERY NIGHT AT BEDTIME, Disp: 90 tablet, Rfl: 1     lisinopril (PRINIVIL/ZESTRIL) 10 MG tablet, Take 1 tablet (10 mg) by mouth daily, Disp: 30 tablet, Rfl: 0     loratadine (CLARITIN) 10 MG tablet, Take 1 tablet (10 mg) by mouth daily, Disp: 90 tablet, Rfl: 0    Allergies - No Known Allergies    Social History -   Social History     Socioeconomic History     Marital status: Single     Spouse  name: Not on file     Number of children: Not on file     Years of education: Not on file     Highest education level: Not on file   Occupational History     Not on file   Social Needs     Financial resource strain: Not on file     Food insecurity:     Worry: Not on file     Inability: Not on file     Transportation needs:     Medical: Not on file     Non-medical: Not on file   Tobacco Use     Smoking status: Former Smoker     Packs/day: 0.50     Years: 40.00     Pack years: 20.00     Types: Cigarettes     Last attempt to quit: 2019     Years since quittin.2     Smokeless tobacco: Never Used   Substance and Sexual Activity     Alcohol use: Yes     Alcohol/week: 14.0 standard drinks     Types: 14 Standard drinks or equivalent per week     Comment: 2 drinks per day     Drug use: No     Sexual activity: Yes     Partners: Male   Lifestyle     Physical activity:     Days per week: Not on file     Minutes per session: Not on file     Stress: Not on file   Relationships     Social connections:     Talks on phone: Not on file     Gets together: Not on file     Attends Anglican service: Not on file     Active member of club or organization: Not on file     Attends meetings of clubs or organizations: Not on file     Relationship status: Not on file     Intimate partner violence:     Fear of current or ex partner: Not on file     Emotionally abused: Not on file     Physically abused: Not on file     Forced sexual activity: Not on file   Other Topics Concern     Parent/sibling w/ CABG, MI or angioplasty before 65F 55M? Not Asked   Social History Narrative     Not on file       Family History -   Family History   Problem Relation Age of Onset     Alzheimer Disease Mother         dementia     Arthritis Mother      Hypertension Mother      C.A.D. Father      Heart Disease Father      Arthritis Father         RA     Cancer Maternal Grandfather         Brain Tumor       Review of Systems - As per HPI and PMHx, otherwise  review of system review of the head and neck negative. Otherwise 10+ review of system is negative    Physical Exam  LMP  (LMP Unknown)   BMI: There is no height or weight on file to calculate BMI.    General - The patient is well nourished and well developed, and appears to have good nutritional status.  Alert and oriented to person and place, answers questions and cooperates with examination appropriately.    SKIN - No suspicious lesions or rashes.  Respiration - No respiratory distress.  Head and Face - Normocephalic and atraumatic, with no gross asymmetry noted of the contour of the facial features.  The facial nerve is intact, with strong symmetric movements.    Voice and Breathing - The patient was breathing comfortably without the use of accessory muscles. The patients voice was clear and strong, and had appropriate pitch and quality.    Ears - Bilateral pinna and EACs with normal appearing overlying skin. Tympanic membrane intact with good mobility on pneumatic otoscopy bilaterally. Bony landmarks of the ossicular chain are normal. The tympanic membranes are normal in appearance. No retraction, perforation, or masses.  No fluid or purulence was seen in the external canal or the middle ear.     Eyes - Extraocular movements intact.  Sclera were not icteric or injected, conjunctiva were pink and moist.    Mouth - Examination of the oral cavity showed pink, healthy oral mucosa. No lesions or ulcerations noted.  The tongue was mobile and midline, and the dentition were in good condition.      Throat - The walls of the oropharynx were smooth, pink, moist, symmetric, and had no lesions or ulcerations.  The tonsillar pillars and soft palate were symmetric.  Neck - Normal midline excursion of the laryngotracheal complex during swallowing.  Full range of motion on passive movement.  Palpation of the occipital, submental, submandibular, internal jugular chain, and supraclavicular nodes did not demonstrate any abnormal  lymph nodes or masses.  The carotid pulse was palpable bilaterally.  Palpation of the thyroid was soft and smooth, with no nodules or goiter appreciated.  The trachea was mobile and midline.    Nose - External contour is symmetric, no gross deflection or scars.  Nasal mucosa is pink and moist with no abnormal mucus.  The septum was midline and non-obstructive, turbinates of normal size and position.  No polyps, masses, or purulence noted on examination.    Neuro - Nonfocal neuro exam is normal, CN 2 through 12 intact, normal gait and muscle tone.      Performed in clinic today:  No procedures preformed in clinic today      A/P - Sally Ware is a 64 year old female Patient presents with:  RECHECK: Sudden hearing loss  Results: MRI results    This plan patient reassured the patient that appears to be no intracranial pathology causing this problem.  Reviewing her audiogram from October on the right side she certainly would not be benefiting from hearing aid especially with significant low frequency loss.  She will work with audiology towards the goal.  In regard to balance I shall review the results [facility] the patient with the results.  She may benefit also from balance rehabilitation therapy.  Otherwise she will see me back in about 6 months and will repeat audiogram at that time.          Norman Us MD        Again, thank you for allowing me to participate in the care of your patient.        Sincerely,        Norman Us MD, MD

## 2019-12-09 ENCOUNTER — OFFICE VISIT (OUTPATIENT)
Dept: FAMILY MEDICINE | Facility: OTHER | Age: 64
End: 2019-12-09
Payer: COMMERCIAL

## 2019-12-09 VITALS
BODY MASS INDEX: 20.01 KG/M2 | SYSTOLIC BLOOD PRESSURE: 118 MMHG | RESPIRATION RATE: 16 BRPM | OXYGEN SATURATION: 96 % | TEMPERATURE: 98 F | DIASTOLIC BLOOD PRESSURE: 72 MMHG | HEART RATE: 102 BPM | WEIGHT: 109.4 LBS

## 2019-12-09 DIAGNOSIS — J30.9 ALLERGIC SINUSITIS: Primary | ICD-10-CM

## 2019-12-09 DIAGNOSIS — I10 ESSENTIAL HYPERTENSION: ICD-10-CM

## 2019-12-09 DIAGNOSIS — G25.81 RESTLESS LEG SYNDROME: ICD-10-CM

## 2019-12-09 PROCEDURE — 99213 OFFICE O/P EST LOW 20 MIN: CPT | Performed by: INTERNAL MEDICINE

## 2019-12-09 RX ORDER — CARBIDOPA AND LEVODOPA 25; 100 MG/1; MG/1
TABLET, EXTENDED RELEASE ORAL
Qty: 90 TABLET | Refills: 3 | Status: SHIPPED | OUTPATIENT
Start: 2019-12-09 | End: 2020-11-10

## 2019-12-09 RX ORDER — LORATADINE 10 MG/1
10 TABLET ORAL DAILY
Qty: 90 TABLET | Refills: 0 | Status: SHIPPED | OUTPATIENT
Start: 2019-12-09

## 2019-12-09 RX ORDER — LISINOPRIL 10 MG/1
10 TABLET ORAL DAILY
Qty: 90 TABLET | Refills: 3 | Status: SHIPPED | OUTPATIENT
Start: 2019-12-09 | End: 2020-12-09

## 2019-12-09 ASSESSMENT — PAIN SCALES - GENERAL: PAINLEVEL: NO PAIN (0)

## 2019-12-09 NOTE — PROGRESS NOTES
Subjective     Sally Ware is a 64 year old female who presents to clinic today for the following health issues:    HPI   Hypertension Follow-up      Do you check your blood pressure regularly outside of the clinic? Yes     Are you following a low salt diet? Yes    Are your blood pressures ever more than 140 on the top number (systolic) OR more   than 90 on the bottom number (diastolic), for example 140/90? No                        Chief Complaint         The patient is a pleasant 64-year-old female who presents today for follow-up of hypertension.  She is currently maintaining a low-salt diet and continues the lisinopril without side effects.  She notes no cough, muscle pain or other similar side effects.  She denies any lightheadedness or dizziness.  She states that the Sinemet is working well with her restless leg syndrome and she is able to sleep throughout the night without difficulty.                       PAST, FAMILY,SOCIAL HISTORY:     Medical  History:   has a past medical history of Restless legs syndrome.     Surgical History:   has a past surgical history that includes no history of surgery and Colonoscopy (N/A, 9/5/2014).     Social History:   reports that she quit smoking about 3 months ago. Her smoking use included cigarettes. She has a 20.00 pack-year smoking history. She has never used smokeless tobacco. She reports current alcohol use of about 14.0 standard drinks of alcohol per week. She reports that she does not use drugs.     Family History:  family history includes Alzheimer Disease in her mother; Arthritis in her father and mother; C.A.D. in her father; Cancer in her maternal grandfather; Heart Disease in her father; Hypertension in her mother.            MEDICATIONS  Current Outpatient Medications   Medication Sig Dispense Refill     augmented betamethasone dipropionate (DIPROLENE-AF) 0.05 % external cream Apply sparingly to affected area twice daily as needed.  Do not apply to  face. 50 g 0     carbidopa-levodopa (SINEMET CR)  MG CR tablet TAKE ONE TABLET BY MOUTH EVERY NIGHT AT BEDTIME 90 tablet 3     lisinopril (PRINIVIL/ZESTRIL) 10 MG tablet Take 1 tablet (10 mg) by mouth daily 90 tablet 3     loratadine (CLARITIN) 10 MG tablet Take 1 tablet (10 mg) by mouth daily 90 tablet 0         --------------------------------------------------------------------------------------------------------------------                              Review of Systems       LUNGS: Pt denies: cough, excess sputum, hemoptysis, or shortness of breath.   HEART: Pt denies: chest pain, arrhythmia, syncope, tachy or bradyarrhythmia.   GI: Pt denies: nausea, vomiting, diarrhea, constipation, melena, or hematochezia.   NEURO: Pt denies: seizures, strokes, diplopia, weakness, paraesthesias, or paralysis.   SKIN: Pt denies: itching, rashes, discoloration, or specific lesions of concern. Denies recent hair loss.   PSYCH: The patient denies significant depression, anxiety, mood imbalance. Specifically denies any suicidal ideation.                                     Examination    /72 (BP Location: Left arm, Patient Position: Sitting, Cuff Size: Adult Regular)   Pulse 102   Temp 98  F (36.7  C) (Temporal)   Resp 16   Wt 49.6 kg (109 lb 6.4 oz)   LMP  (LMP Unknown)   SpO2 96%   BMI 20.01 kg/m       Constitutional: The patient appears to be in no acute distress. The patient appears to be adequately hydrated. No acute respiratory or hemodynamic distress is noted at this time.   LUNGS: clear bilaterally, airflow is brisk, no intercostal retraction or stridor is noted. No coughing is noted during visit.   HEART:  regular without rubs, clicks, gallops, or murmurs. PMI is nondisplaced. Upstrokes are brisk. S1,S2 are heard.   GI: Abdomen is soft, without rebound, guarding or tenderness. Bowel sounds are appropriate. No renal bruits are heard.   NEURO: Pt is alert and appropriate. No neurologic lateralization is  noted. Cranial nerves 2-12 are intact. Peripheral sensory and motor function are grossly normal.    SKIN:  warm and dry. No erythema, or rashes are noted. No specific lesions of concern are noted.    PSYCH: The patient appears grossly appropriate. Maintains good eye contact, does not have any jittery or atypical motion. Displays appropriate affect.                                           Decision Making    1. Essential hypertension  Continue current medication  - lisinopril (PRINIVIL/ZESTRIL) 10 MG tablet; Take 1 tablet (10 mg) by mouth daily  Dispense: 90 tablet; Refill: 3    2. Restless leg syndrome  Continue current medication  - carbidopa-levodopa (SINEMET CR)  MG CR tablet; TAKE ONE TABLET BY MOUTH EVERY NIGHT AT BEDTIME  Dispense: 90 tablet; Refill: 3    3. Allergic sinusitis  Controlled with Claritin, continue current medication  - loratadine (CLARITIN) 10 MG tablet; Take 1 tablet (10 mg) by mouth daily  Dispense: 90 tablet; Refill: 0                           FOLLOW UP   I have asked the patient to make an appointment for followup with me in 4 months        I have carefully explained the diagnosis and treatment options to the patient.  The patient has displayed an understanding of the above, and all subsequent questions were answered.      DO RAYMOND Stewart    Portions of this note were produced using [employer]  Although every attempt at real-time proof reading has been made, occasional grammar/syntax errors may have been missed.

## 2019-12-13 ENCOUNTER — OFFICE VISIT (OUTPATIENT)
Dept: AUDIOLOGY | Facility: CLINIC | Age: 64
End: 2019-12-13
Payer: COMMERCIAL

## 2019-12-13 DIAGNOSIS — H90.3 SENSORINEURAL HEARING LOSS, BILATERAL: Primary | ICD-10-CM

## 2019-12-13 DIAGNOSIS — H90.A31 MIXED CONDUCTIVE AND SENSORINEURAL HEARING LOSS OF RIGHT EAR WITH RESTRICTED HEARING OF LEFT EAR: ICD-10-CM

## 2019-12-13 PROCEDURE — V5275 EAR IMPRESSION: HCPCS | Mod: RT | Performed by: AUDIOLOGIST

## 2019-12-13 PROCEDURE — 92590 HC HEARING AID EXAM MONAURAL: CPT | Mod: RT | Performed by: AUDIOLOGIST

## 2019-12-13 PROCEDURE — 99207 ZZC NO CHARGE LOS: CPT | Performed by: AUDIOLOGIST

## 2019-12-13 NOTE — PROGRESS NOTES
AUDIOLOGY REPORT    SUBJECTIVE: Sally Ware is a 64 year old female was seen at [facility] on 12/13/19 to discuss concerns with hearing and functional communication difficulties. The patient was unaccompanied. Sally has been seen previously on October 15, 2019, and results revealed a severe to mild to normal to mild to severe mixed hearing loss right ear and mild rising to normal to mild sensorineural hearing loss left ear.  The patient was medically evaluated and determined to be cleared for a right hearing aid by ARIANA Us M.D. Sally notes difficulty with communication in a variety of listening situations, difficulty on the phone, localizing sound (worrisome for driving), and difficulty in groups..    OBJECTIVE:  Patient is a hearing aid candidate. Patient would like to move forward with a hearing aid evaluation today. Therefore, the patient was presented with different options for amplification to help aid in communication. Discussed styles, levels of technology and monaural vs. binaural fitting.     The hearing aid(s) mutually chosen were:  Right: [employer] OPN 2 miniRITE-T  COLOR: Chroma Beige  BATTERY SIZE: 312  EARMOLD/TIPS: canal  CANAL/ LENGTH: 2    Otoscopy revealed ears are clear of cerumen bilaterally. A right earmold was taken without incident.    ASSESSMENT:     ICD-10-CM    1. Sensorineural hearing loss, bilateral H90.3 HEARING AID EXAM MONAURAL     EAR IMPRESSION, EACH   2. Mixed conductive and sensorineural hearing loss of right ear with restricted hearing of left ear H90.A31 HEARING AID EXAM MONAURAL     EAR IMPRESSION, EACH       Reviewed purchase information and warranty information with patient. The 45 day trial period was explained to patient. The patient was given a copy of the [place] [employer] consumer brochure on purchasing hearing instruments. Patient risk factors have been provided to the patient in writing prior to the  sale of the hearing aid per FDA regulation. The risk factors are also available in the User Instructional Booklet to be presented on the day of the hearing aid fitting. Hearing aid(s) ordered. Hearing aid evaluation completed.    PLAN: Sally is scheduled to return in 2-3 weeks for a hearing aid fitting and programming. Purchase agreement will be completed on that date. Please contact this clinic with any questions or concerns.      Ruthie Perrin.  [place] Licensed Audiologist #[id number]

## 2020-01-03 ENCOUNTER — OFFICE VISIT (OUTPATIENT)
Dept: AUDIOLOGY | Facility: CLINIC | Age: 65
End: 2020-01-03
Payer: COMMERCIAL

## 2020-01-03 ENCOUNTER — TRANSFERRED RECORDS (OUTPATIENT)
Dept: HEALTH INFORMATION MANAGEMENT | Facility: CLINIC | Age: 65
End: 2020-01-03

## 2020-01-03 DIAGNOSIS — H90.A31 MIXED CONDUCTIVE AND SENSORINEURAL HEARING LOSS OF RIGHT EAR WITH RESTRICTED HEARING OF LEFT EAR: Primary | ICD-10-CM

## 2020-01-03 PROCEDURE — V5257 HEARING AID, DIGIT, MON, BTE: HCPCS | Mod: NU | Performed by: AUDIOLOGIST

## 2020-01-03 PROCEDURE — V5020 CONFORMITY EVALUATION: HCPCS | Performed by: AUDIOLOGIST

## 2020-01-03 PROCEDURE — V5241 DISPENSING FEE, MONAURAL: HCPCS | Mod: RT | Performed by: AUDIOLOGIST

## 2020-01-03 PROCEDURE — 92592 HC HEARING AID CHECK, MONAURAL: CPT | Performed by: AUDIOLOGIST

## 2020-01-03 PROCEDURE — V5264 EAR MOLD/INSERT: HCPCS | Mod: NU | Performed by: AUDIOLOGIST

## 2020-01-03 PROCEDURE — V5011 HEARING AID FITTING/CHECKING: HCPCS | Performed by: AUDIOLOGIST

## 2020-01-03 PROCEDURE — 99207 ZZC NO CHARGE LOS: CPT | Performed by: AUDIOLOGIST

## 2020-01-03 NOTE — PATIENT INSTRUCTIONS

## 2020-01-03 NOTE — PROGRESS NOTES
AUDIOLOGY REPORT    SUBJECTIVE: Sally Ware, a 64 year old female, was seen iat [facility] today for a Right hearing aid fitting. Previous results have revealed a unilateral severe to mild to normal to mild to severe mixed hearing loss. The patient was given medical clearance to pursue amplification by  ARIANA Us MD.      OBJECTIVE:  Prior to fitting, a hearing aid check was performed to ensure device functionality. The hearing aid conformity evaluation was completed.The hearing aids were placed and they provided a good fit. Real-ear-probe-microphone measurements were completed on the Haofangtong system and were a good match to NAL-NL2 target with soft sounds audible, moderate sounds comfortable, and loud sounds below discomfort. UCLs are verified through maximum power output measures and demonstrate appropriate limiting of loud inputs. Ms. Ware was oriented to proper hearing aid use, care, cleaning (no water, dry brush), batteries (size 312, insertion/removal, toxicity, low-battery signal), aid insertion/removal, user booklet, warranty information, storage cases, and other hearing aid details. The patient confirmed understanding of hearing aid use and care, and showed proper insertion of hearing aid and batteries while in the office today. Ms. Ware reported good volume and sound quality today.    EAR(S) FIT: Right  MA HEARING AID MAKE: Right: Oticon OPN 2 miniRITE-T  MA HEARING AID MODEL #: Right: [id number]  HEARING AID STYLE: Right: BTE/RITE   LENGTH: Right: Length 1 gain 85   EARMOLDS: Right: Oticon Acrylic miniFit SN [id number]    SERIAL NUMBERS: Right: [id number]  WARRANTY END DATE: Right: 1/18/2023       CHARGES:   Earmold(s): , Qty 1, $80.00, NU (New) and RT (Right)  Hearing Aid Check: Monaural, 27086, $49.00  Dispensing Fee: Monaural, , $400.00, RT (Right)  Fit/Orientation: Monaural, , $185.00  Hearing Aid Conformity Evaluation: 1, ,  $87.00RT  Hearing Aid Digital: Monaural, BTE, ,RT, NU, $2079.00  Total: $2880.00     ASSESSMENT: A right Oticon OPN 2 miniRITE-T hearing aid fitting completed today. Verification measures were performed. The 45 day trial period was explained to patient, and they expressed understanding. Ms. Ware signed the Hearing Aid Purchase Agreement and was given a copy, as well as details on her hearing aids. Patient was counseled that exact out of pocket amounts cannot be determined for hearing aid claims being sent to insurance. Any insurance coverage information presented to the patient is an estimate only, and is not a guarantee of payment. Patient has been advised to check with their own insurance.    PLAN: Ms. Ware will return for follow-up in 2-3 weeks for a hearing aid review appointment. Please call this clinic with questions regarding today s appointment.    Unique Perr[place] Licensed Audiologist #[id number]

## 2020-01-16 ENCOUNTER — OFFICE VISIT (OUTPATIENT)
Dept: AUDIOLOGY | Facility: CLINIC | Age: 65
End: 2020-01-16
Payer: COMMERCIAL

## 2020-01-16 DIAGNOSIS — H90.3 SENSORINEURAL HEARING LOSS, BILATERAL: ICD-10-CM

## 2020-01-16 DIAGNOSIS — H90.A31 MIXED CONDUCTIVE AND SENSORINEURAL HEARING LOSS OF RIGHT EAR WITH RESTRICTED HEARING OF LEFT EAR: Primary | ICD-10-CM

## 2020-01-16 PROCEDURE — 99207 ZZC NO CHARGE LOS: CPT | Performed by: AUDIOLOGIST

## 2020-01-16 PROCEDURE — V5299 HEARING SERVICE: HCPCS | Performed by: AUDIOLOGIST

## 2020-01-28 NOTE — PROGRESS NOTES
AUDIOLOGY REPORT    SUBJECTIVE:  Sally Ware is a 64 year old female who was seen at [facility] [place] on 1/16/2020  for a follow-up check regarding the fitting of a new right hearing aid. Previous results have revealed severe to mild rising to normal to mild to moderately severe mixed hearing loss right ear mild to low normal hearing sensitivity left ear.  The patient has been seen previously in this clinic and was fit with a right Oticon OPN 2 RITE on 1/3/2020.  Sally reports improved ability to hear conversation, decreased volume on TV, and she was able to follow a conversation in a group / restaurant situation.     OBJECTIVE:   Based on patient report, the following changes were made; no programming changes were made.  She left 1/3/2020 with a pretty good match to prescriptive NAL-NL2 targets (below at 250 Hz and 500 Hz but otherwise within 5 dB). May experiment with increased low frequency amplification in a few months, but will also increase background noise.    Reviewed 45 day trial period, care, cleaning (no water, dry brush), batteries (size 312) insertion/removal, toxicity, low-battery signal), aid insertion/removal, volume adjustment (if applicable), user booklet, warranty information, storage cases, and other hearing aid details.     No charge visit today (in warranty hearing aid check).    ASSESSMENT:   A follow-up appointment for hearing aid fitting was completed today. Changes to hearing aid was completed as outlined above.     PLAN:  Sally will return for follow-up as needed, in 2-3 weeks for another appointemnt to check on progress.. Please call this clinic with any questions regarding today s appointment.    Ruthie Perrin.  [place] Licensed Audiologist #[id number]

## 2020-02-14 ENCOUNTER — OFFICE VISIT (OUTPATIENT)
Dept: AUDIOLOGY | Facility: CLINIC | Age: 65
End: 2020-02-14
Payer: COMMERCIAL

## 2020-02-14 DIAGNOSIS — H90.3 SENSORINEURAL HEARING LOSS, BILATERAL: ICD-10-CM

## 2020-02-14 DIAGNOSIS — H90.A31 MIXED CONDUCTIVE AND SENSORINEURAL HEARING LOSS OF RIGHT EAR WITH RESTRICTED HEARING OF LEFT EAR: Primary | ICD-10-CM

## 2020-02-14 PROCEDURE — V5299 HEARING SERVICE: HCPCS | Performed by: AUDIOLOGIST

## 2020-02-14 PROCEDURE — 99207 ZZC NO CHARGE LOS: CPT | Performed by: AUDIOLOGIST

## 2020-02-14 NOTE — PROGRESS NOTES
AUDIOLOGY REPORT     SUBJECTIVE:  Sally Ware is a 64 year old female who was seen at [facility] [place] [facility] on 2/14/2020  for a follow-up check regarding the fitting of a new right hearing aid. Previous results have revealed severe to mild rising to normal to mild to moderately severe mixed hearing loss right ear mild to low normal hearing sensitivity left ear.  The patient has been seen previously in this clinic and was fit with a right Oticon OPN 2 RITE on 1/3/2020.  Sally reports improved ability to hear conversation, decreased volume on TV, and she was able to follow a conversation in a group / restaurant situation. She reports right side seems balanced with better hearing left side.     OBJECTIVE:   Based on patient report, the following changes were made; increased amplification below 1000 Hz with slightly more at 125 Hz to 750 Hz, she denied significant change (which is good because we're probably closer to prescriptive speech targets with negative impact).     The International Outcome Inventory-Hearing Aids (IOI-HA) was administered today.  It is a questionnaire designed to be generally applicable in evaluating the effectiveness of hearing aid treatments.  The patient s responses to the 7 questions can be compared to normative data relative to how others are performing with their hearing aids, as well as focusing audiologic care and counseling.  This patient scored 33 out of 35 possible.  The patient s average per question score was 4.71 which indicates an above average outcome (average per question score is 3.5).  This patient s Quality of Life score (Question 7) was 5, which is also above average.    Datalogging shows 10 hours 18 minutes of use per day, consistent with IOI-HA report.    No charge visit today (in warranty hearing aid check).     ASSESSMENT:   A follow-up appointment for hearing aid fitting was completed today. IOI-HA completed today. Changes to hearing aid was  completed as outlined above.      PLAN:  Sally will return for follow-up with real ear measures in 3 months, sooner if there are problems. Please call this clinic with any questions regarding today s appointment.     Ruthie Perrin.  [place] Licensed Audiologist #[id number]

## 2020-03-02 ENCOUNTER — HEALTH MAINTENANCE LETTER (OUTPATIENT)
Age: 65
End: 2020-03-02

## 2020-03-10 ENCOUNTER — TELEPHONE (OUTPATIENT)
Dept: AUDIOLOGY | Facility: CLINIC | Age: 65
End: 2020-03-10
Payer: COMMERCIAL

## 2020-03-10 DIAGNOSIS — H90.A31 MIXED CONDUCTIVE AND SENSORINEURAL HEARING LOSS OF RIGHT EAR WITH RESTRICTED HEARING OF LEFT EAR: Primary | ICD-10-CM

## 2020-03-10 DIAGNOSIS — H90.3 SENSORINEURAL HEARING LOSS, BILATERAL: ICD-10-CM

## 2020-03-10 PROCEDURE — 99207 ZZC NO CHARGE LOS: CPT | Performed by: AUDIOLOGIST

## 2020-03-10 PROCEDURE — V5266 BATTERY FOR HEARING DEVICE: HCPCS | Performed by: AUDIOLOGIST

## 2020-03-10 NOTE — TELEPHONE ENCOUNTER
Patient left a message 2/28/2020 to send replacement batteries to her home address.  24 size 312 batteries were sent 3/10/2020 as requested.    Ruthie Perrin.  [place] Licensed Audiologist #[id number]

## 2020-06-23 ENCOUNTER — TELEPHONE (OUTPATIENT)
Dept: AUDIOLOGY | Facility: CLINIC | Age: 65
End: 2020-06-23

## 2020-06-30 ENCOUNTER — OFFICE VISIT (OUTPATIENT)
Dept: AUDIOLOGY | Facility: CLINIC | Age: 65
End: 2020-06-30
Payer: COMMERCIAL

## 2020-06-30 DIAGNOSIS — H90.3 SENSORINEURAL HEARING LOSS, BILATERAL: ICD-10-CM

## 2020-06-30 DIAGNOSIS — H90.A31 MIXED CONDUCTIVE AND SENSORINEURAL HEARING LOSS OF RIGHT EAR WITH RESTRICTED HEARING OF LEFT EAR: Primary | ICD-10-CM

## 2020-06-30 PROCEDURE — V5266 BATTERY FOR HEARING DEVICE: HCPCS | Performed by: AUDIOLOGIST

## 2020-06-30 PROCEDURE — 92592 HC HEARING AID CHECK, MONAURAL: CPT | Mod: RT | Performed by: AUDIOLOGIST

## 2020-06-30 PROCEDURE — 99207 ZZC NO CHARGE LOS: CPT | Performed by: AUDIOLOGIST

## 2020-06-30 NOTE — PROGRESS NOTES
Hearing Aid Check     SUBJECTIVE:  Sally Ware is a 64 year old year old female, seen today for a hearing aid check at [facility]. Patient reports the right hearing aid is not functioning after cleaning.  Previous results have revealed a unilateral severe to mild to normal to mild to severe mixed hearing loss.  Sally was fit with a right Oticon OPN 2 mini-RITE-T hearing aid 1/3/2020.     OBJECTIVE:  Visual inspection revealed two wax filters were in the earmold and .  Removed the wax filter from the tip of the earmold, removed the  from the earmold then the filter from the .  Vacuumed medardo ports. Replaced  filter only.  The filter did not stay in the earmold (not sure if part of the grommet is missing).  Also the grommet feels loose, recommended replacement.  Sally requested replacement size 312 batteries for use in her hearing aid dispensed 24.     PLAN:   Ordered replacement right earmold, then send her's in and see if Oticon can glue/replace the grommet.  Return to clinic as needed for hearing aid maintenance and programming.    Ruthie Perrin.  [place] Licensed Audiologist #[id number]

## 2020-08-25 ENCOUNTER — OFFICE VISIT (OUTPATIENT)
Dept: AUDIOLOGY | Facility: CLINIC | Age: 65
End: 2020-08-25
Payer: COMMERCIAL

## 2020-08-25 DIAGNOSIS — H90.A31 MIXED CONDUCTIVE AND SENSORINEURAL HEARING LOSS OF RIGHT EAR WITH RESTRICTED HEARING OF LEFT EAR: Primary | ICD-10-CM

## 2020-08-25 DIAGNOSIS — H90.3 SENSORINEURAL HEARING LOSS, BILATERAL: ICD-10-CM

## 2020-08-25 PROCEDURE — 99207 ZZC NO CHARGE LOS: CPT | Performed by: AUDIOLOGIST

## 2020-08-25 PROCEDURE — V5264 EAR MOLD/INSERT: HCPCS | Mod: RT | Performed by: AUDIOLOGIST

## 2020-10-26 ENCOUNTER — TELEPHONE (OUTPATIENT)
Dept: AUDIOLOGY | Facility: CLINIC | Age: 65
End: 2020-10-26
Payer: COMMERCIAL

## 2020-10-26 DIAGNOSIS — H90.A31 MIXED CONDUCTIVE AND SENSORINEURAL HEARING LOSS OF RIGHT EAR WITH RESTRICTED HEARING OF LEFT EAR: Primary | ICD-10-CM

## 2020-10-26 DIAGNOSIS — H90.3 SENSORINEURAL HEARING LOSS, BILATERAL: ICD-10-CM

## 2020-10-26 PROCEDURE — 99207 PR NO CHARGE LOS: CPT | Performed by: AUDIOLOGIST

## 2020-10-26 PROCEDURE — V5266 BATTERY FOR HEARING DEVICE: HCPCS | Performed by: AUDIOLOGIST

## 2020-10-26 NOTE — TELEPHONE ENCOUNTER
Patient left a message 10/26/2020 to send replacement batteries to her home address.  24 size 312 batteries were sent 10/26/2020 as requested.     Ruthie Perrin.  [place] Licensed Audiologist #[id number]

## 2020-11-03 ENCOUNTER — ANCILLARY PROCEDURE (OUTPATIENT)
Dept: MAMMOGRAPHY | Facility: OTHER | Age: 65
End: 2020-11-03
Attending: INTERNAL MEDICINE
Payer: MEDICARE

## 2020-11-03 DIAGNOSIS — Z12.31 VISIT FOR SCREENING MAMMOGRAM: ICD-10-CM

## 2020-11-03 PROCEDURE — 77067 SCR MAMMO BI INCL CAD: CPT | Performed by: RADIOLOGY

## 2020-11-09 DIAGNOSIS — G25.81 RESTLESS LEG SYNDROME: ICD-10-CM

## 2020-11-10 RX ORDER — CARBIDOPA AND LEVODOPA 25; 100 MG/1; MG/1
TABLET, EXTENDED RELEASE ORAL
Qty: 90 TABLET | Refills: 0 | Status: SHIPPED | OUTPATIENT
Start: 2020-11-10 | End: 2021-04-19

## 2020-11-10 NOTE — TELEPHONE ENCOUNTER
Medication is being filled for 1 time refill only due to:  Patient needs to be seen because it has been more than one year since last visit.     Routing to schedulers to set up appointment for patient (ON OR AFTER 12/9/2020).  Patient has been given #90 to get to appointment per triage protocol.    KATLYN GarN, RN  [facility]

## 2020-12-09 DIAGNOSIS — I10 ESSENTIAL HYPERTENSION: ICD-10-CM

## 2020-12-09 RX ORDER — LISINOPRIL 10 MG/1
TABLET ORAL
Qty: 90 TABLET | Refills: 3 | Status: SHIPPED | OUTPATIENT
Start: 2020-12-09 | End: 2021-07-08

## 2020-12-09 NOTE — TELEPHONE ENCOUNTER
Routing refill request to provider for review/approval because:  Labs not current:    Potassium   Date Value Ref Range Status   06/12/2015 3.6 3.4 - 5.3 mmol/L Final     Creatinine   Date Value Ref Range Status   06/12/2015 0.58 0.52 - 1.04 mg/dL Final     And blood pressure.      Di Jama RN  Triage Nurse  [facility] appointment line  [facility], 24 hour nurse line, available by calling clinic at [phone] and following prompts.

## 2020-12-14 ENCOUNTER — VIRTUAL VISIT (OUTPATIENT)
Dept: INTERNAL MEDICINE | Facility: CLINIC | Age: 65
End: 2020-12-14
Payer: MEDICARE

## 2020-12-14 DIAGNOSIS — I10 ESSENTIAL HYPERTENSION: ICD-10-CM

## 2020-12-14 DIAGNOSIS — G25.81 RESTLESS LEG SYNDROME: Primary | ICD-10-CM

## 2020-12-14 DIAGNOSIS — F17.200 TOBACCO USE DISORDER: ICD-10-CM

## 2020-12-14 DIAGNOSIS — E78.5 HYPERLIPIDEMIA LDL GOAL <130: ICD-10-CM

## 2020-12-14 PROCEDURE — 99214 OFFICE O/P EST MOD 30 MIN: CPT | Mod: 95 | Performed by: INTERNAL MEDICINE

## 2020-12-14 RX ORDER — ROPINIROLE 0.5 MG/1
0.5 TABLET, FILM COATED ORAL 3 TIMES DAILY
Qty: 30 TABLET | Refills: 0 | Status: SHIPPED | OUTPATIENT
Start: 2020-12-14 | End: 2020-12-21

## 2020-12-14 NOTE — PROGRESS NOTES
"Sally Ware is a 65 year old female who is being evaluated via a billable video visit.      The patient has been notified of following:     \"This video visit will be conducted via a call between you and your physician/provider. We have found that certain health care needs can be provided without the need for an in-person physical exam.  This service lets us provide the care you need with a video conversation.  If a prescription is necessary we can send it directly to your pharmacy.  If lab work is needed we can place an order for that and you can then stop by our lab to have the test done at a later time.    Video visits are billed at different rates depending on your insurance coverage.  Please reach out to your insurance provider with any questions.     If during the course of the call the physician/provider feels a video visit is not appropriate, you will not be charged for this service.\"    Patient has given verbal consent for Video visit? Yes  How would you like to obtain your AVS? MyChart  If you are dropped from the video visit, the video invite should be resent to: Text to cell phone: [phone]  Will anyone else be joining your video visit? No      Subjective     Sally Ware is a 65 year old female who presents today via video visit for the following health issues:    HPI     Hypertension Follow-up      Do you check your blood pressure regularly outside of the clinic? No     Are you following a low salt diet? Yes    Are your blood pressures ever more than 140 on the top number (systolic) OR more   than 90 on the bottom number (diastolic), for example 140/90? No         EMR reviewed including: History of chief complaint, pertinent distant history, medications, concurrent diagnoses.             Chief Complaint:  #1   Follow-up on hypertension  Taking lisinopril and doing well.  Denies cough or side effects.  Is due for lab work including renal function.  Checks blood pressure on occasion and " values have been acceptable.    #2 ongoing restless leg syndrome.  Sinemet worked previously but is no longer helpful.  Difficulty making it through the night.  Becoming more fatigued.                         PAST, FAMILY,SOCIAL HISTORY:     Medical  History:   has a past medical history of Restless legs syndrome.     Surgical History:   has a past surgical history that includes no history of surgery and Colonoscopy (N/A, 9/5/2014).     Social History:   reports that she quit smoking about 15 months ago. Her smoking use included cigarettes. She has a 20.00 pack-year smoking history. She has never used smokeless tobacco. She reports current alcohol use of about 14.0 standard drinks of alcohol per week. She reports that she does not use drugs.     Family History:  family history includes Alzheimer Disease in her mother; Arthritis in her father and mother; C.A.D. in her father; Cancer in her maternal grandfather; Heart Disease in her father; Hypertension in her mother.            MEDICATIONS  Current Outpatient Medications   Medication Sig Dispense Refill     augmented betamethasone dipropionate (DIPROLENE-AF) 0.05 % external cream Apply sparingly to affected area twice daily as needed.  Do not apply to face. 50 g 0     carbidopa-levodopa (SINEMET CR)  MG CR tablet TAKE ONE TABLET BY MOUTH EVERY NIGHT AT BEDTIME 90 tablet 0     lisinopril (ZESTRIL) 10 MG tablet TAKE ONE TABLET BY MOUTH ONCE DAILY 90 tablet 3     loratadine (CLARITIN) 10 MG tablet Take 1 tablet (10 mg) by mouth daily 90 tablet 0     rOPINIRole (REQUIP) 0.5 MG tablet Take 1 tablet (0.5 mg) by mouth 3 times daily 30 tablet 0         --------------------------------------------------------------------------------------------------------------------                              Review of Systems       LUNGS: Pt denies: cough, excess sputum, hemoptysis, or shortness of breath.   HEART: Pt denies: chest pain, arrhythmia, syncope, tachy or  bradyarrhythmia.   GI: Pt denies: nausea, vomiting, diarrhea, constipation, melena, or hematochezia.   NEURO: Pt denies: seizures, strokes, diplopia, weakness, paraesthesias, or paralysis.  Does have the restless leg syndrome at night which is worsening.   SKIN: Pt denies: itching, rashes, discoloration, or specific lesions of concern. Denies recent hair loss.   PSYCH: The patient denies significant depression, anxiety, mood imbalance. Specifically denies any suicidal ideation.        Examination     Vital signs were not taken at this time as this is a video/virtual visit.   Constitutional: The patient appears to be in no acute distress. The patient appears to be adequately hydrated. No acute respiratory or hemodynamic distress is noted at this time.   LUNGS: Voice is dry.  No rhonchi are heard in conversation.  No dyspnea or wheezing is noted.   HEART: Patient has good color, no edema is present.  Denies arrhythmia.   GI: Abdomen: Patient had no abdominal pain.  No discomfort with self palpation is noted.   SKIN:  warm and dry. No erythema, or rashes are noted. No specific lesions of concern are noted.    PSYCH: The patient appears grossly appropriate. Maintains good eye contact, does not have any jittery or atypical motion. Displays appropriate affect.         Decision Making       1. Restless leg syndrome  Discontinue Sinemet in favor of Requip.  If trial good, continue Requip.  If ineffective, will be part of Sinemet medication.  - rOPINIRole (REQUIP) 0.5 MG tablet; Take 1 tablet (0.5 mg) by mouth 3 times daily  Dispense: 30 tablet; Refill: 0    2. Tobacco use disorder (SMOKERS')  Recommend smoking cessation, patient not interested at this time.    3. Hyperlipidemia LDL goal <130  Check lipid levels and liver function with considerations toward statin therapy  - Lipid panel reflex to direct LDL Fasting; Future  - **Hepatic panel FUTURE 2mo; Future    4. Essential hypertension  Check renal function as well as  electrolytes and urine for protein  - **UA reflex to Microscopic FUTURE anytime; Future  - **Basic metabolic panel FUTURE anytime; Future                                 FOLLOW UP   I have asked the patient to make an appointment for followup with me in  2 weeks for follow-up of restless leg syndrome and blood pressure            I have carefully explained the diagnosis and treatment options to the patient.  The patient has displayed an understanding of the above, and all subsequent questions were answered.      DO RAYMOND Stewart    Portions of this note were produced using [employer]  Although every attempt at real-time proof reading has been made, occasional grammar/syntax errors may have been missed.      Video information:  Start time: 10:15 AM  End time: 10:27 AM  Duration: 12 minutes  Origination: By the patient in her home  Format: Doximity  Provider location: Still sitting in my comfy chair, but now my feet are up.

## 2020-12-20 ENCOUNTER — HEALTH MAINTENANCE LETTER (OUTPATIENT)
Age: 65
End: 2020-12-20

## 2020-12-21 DIAGNOSIS — E78.5 HYPERLIPIDEMIA LDL GOAL <130: ICD-10-CM

## 2020-12-21 DIAGNOSIS — G25.81 RESTLESS LEG SYNDROME: ICD-10-CM

## 2020-12-21 DIAGNOSIS — I10 ESSENTIAL HYPERTENSION: ICD-10-CM

## 2020-12-21 LAB
ALBUMIN SERPL-MCNC: 4.3 G/DL (ref 3.4–5)
ALBUMIN UR-MCNC: NEGATIVE MG/DL
ALP SERPL-CCNC: 76 U/L (ref 40–150)
ALT SERPL W P-5'-P-CCNC: 46 U/L (ref 0–50)
ANION GAP SERPL CALCULATED.3IONS-SCNC: 6 MMOL/L (ref 3–14)
APPEARANCE UR: ABNORMAL
AST SERPL W P-5'-P-CCNC: 37 U/L (ref 0–45)
BACTERIA #/AREA URNS HPF: ABNORMAL /HPF
BILIRUB DIRECT SERPL-MCNC: 0.2 MG/DL (ref 0–0.2)
BILIRUB SERPL-MCNC: 0.8 MG/DL (ref 0.2–1.3)
BILIRUB UR QL STRIP: NEGATIVE
BUN SERPL-MCNC: 11 MG/DL (ref 7–30)
CALCIUM SERPL-MCNC: 9.7 MG/DL (ref 8.5–10.1)
CHLORIDE SERPL-SCNC: 102 MMOL/L (ref 94–109)
CHOLEST SERPL-MCNC: 215 MG/DL
CO2 SERPL-SCNC: 28 MMOL/L (ref 20–32)
COLOR UR AUTO: YELLOW
CREAT SERPL-MCNC: 0.56 MG/DL (ref 0.52–1.04)
GFR SERPL CREATININE-BSD FRML MDRD: >90 ML/MIN/{1.73_M2}
GLUCOSE SERPL-MCNC: 78 MG/DL (ref 70–99)
GLUCOSE UR STRIP-MCNC: NEGATIVE MG/DL
HDLC SERPL-MCNC: 136 MG/DL
HGB UR QL STRIP: NEGATIVE
KETONES UR STRIP-MCNC: 20 MG/DL
LDLC SERPL CALC-MCNC: 71 MG/DL
LEUKOCYTE ESTERASE UR QL STRIP: ABNORMAL
MUCOUS THREADS #/AREA URNS LPF: PRESENT /LPF
NITRATE UR QL: NEGATIVE
NONHDLC SERPL-MCNC: 79 MG/DL
PH UR STRIP: 6 PH (ref 5–7)
POTASSIUM SERPL-SCNC: 4 MMOL/L (ref 3.4–5.3)
PROT SERPL-MCNC: 8 G/DL (ref 6.8–8.8)
RBC #/AREA URNS AUTO: 1 /HPF (ref 0–2)
SODIUM SERPL-SCNC: 136 MMOL/L (ref 133–144)
SOURCE: ABNORMAL
SP GR UR STRIP: 1.02 (ref 1–1.03)
SQUAMOUS #/AREA URNS AUTO: 5 /HPF (ref 0–1)
TRIGL SERPL-MCNC: 39 MG/DL
UROBILINOGEN UR STRIP-MCNC: 4 MG/DL (ref 0–2)
WBC #/AREA URNS AUTO: 1 /HPF (ref 0–5)

## 2020-12-21 PROCEDURE — 36415 COLL VENOUS BLD VENIPUNCTURE: CPT | Performed by: INTERNAL MEDICINE

## 2020-12-21 PROCEDURE — 81001 URINALYSIS AUTO W/SCOPE: CPT | Performed by: INTERNAL MEDICINE

## 2020-12-21 PROCEDURE — 80076 HEPATIC FUNCTION PANEL: CPT | Performed by: INTERNAL MEDICINE

## 2020-12-21 PROCEDURE — 80061 LIPID PANEL: CPT | Performed by: INTERNAL MEDICINE

## 2020-12-21 PROCEDURE — 80048 BASIC METABOLIC PNL TOTAL CA: CPT | Performed by: INTERNAL MEDICINE

## 2020-12-21 RX ORDER — ROPINIROLE 0.5 MG/1
0.5 TABLET, FILM COATED ORAL 3 TIMES DAILY
Qty: 90 TABLET | Refills: 0 | Status: SHIPPED | OUTPATIENT
Start: 2020-12-21 | End: 2021-01-25

## 2020-12-21 NOTE — TELEPHONE ENCOUNTER
New medication a couple weeks ago, Requip   She is letting you know that this new medication works way much better.  But now only has enough for a couple days an would like to get a [facility]ill [facility]   Chastity THORNTON     PS.  Patient also completed blood work today 12/21/2020

## 2020-12-22 NOTE — RESULT ENCOUNTER NOTE
Dear Sally, your recent test results are attached.    Cholesterol is well controlled with an LDL of 71.  Liver tests are all within normal limits.  Chemistry is normal including the blood sugar and kidney function.  Urinary analysis is essentially normal.    You will be contacted with any outstanding results when they are available.  Feel free to contact me via the office or My Chart if you have any questions regarding the above.  Sincerely,  DO STANFORD StewartOI

## 2021-01-14 ENCOUNTER — PATIENT OUTREACH (OUTPATIENT)
Dept: GERIATRIC MEDICINE | Facility: CLINIC | Age: 66
End: 2021-01-14

## 2021-01-14 NOTE — LETTER
January 14, 2021    SALLY FISHER  [address] [address]      Dear Sally,    Welcome to [facility] ([facility]) health program. My name is VIGNESH Sears. I am your [facility] care coordinator.     I will call you soon to see how you are doing and determine what needs you may have. My job is to help connect you to services, complete an assessment, and develop a care plan with you. There is no charge to you for the care coordination and assessment services. Our goal is to keep you as healthy and independent as possible.     [facility] combines the benefits you may already receive from Medical Assistance, Medicare and the Prescription Drug Coverage Program.    Soon you will receive a new [facility] member identification (ID) card from [facility]. When you receive it, please use this card where you get your health services.]    If you have questions, please call me at [phone]. If you reach my voice mail, leave a message and your phone number. If you are hearing impaired, please call the [place] Relay at 889 or [phone] (yymntd-vb-malgdj relay service).    Sincerely,    VIGNESH Sears    E-mail: [email or web address]  Phone: [phone]      [facility] (\[facility]\"") is a health plan that contracts with both Medicare and the [place] Medical Assistance (Medicaid) program to provide benefits of both programs to enrollees. Enrollment in [facility] depends on contract renewal.    [facility]+ [id number] DHS Approved ([id number])  [id number] (11/18)

## 2021-01-14 NOTE — PROGRESS NOTES
[facility] Care Coordination Contact    Member became effective with [facility] on 1/1/21 with [facility].  Previous Health Plan: [place]/Fee For Service  Previous Care System: [facility]  Previous care coordinators name and number: Dora Mina Type: N/A  Last MMIS Entry: Date 11/23/20 and Type 07 ADMIN ACT- UNABLE TO LOCATE  MMIS visit date (and type) if different from above: NA  Services Listed in MMIS: None  UTF received: No: Requested on No, per MMIS no documents to request      [facility] & CC profile sent     Rakel Soria  Care Management Specialist   [facility]   [phone]    1/15/2020  cc reviewed EMR.      cc attempted to reach member on the Cell # listed in her EMR.  VM left and it was clients name on the VM message.  cc requested a return call and informed her of the reason for my call and that I would like to know if she will continue to see Dr. Carroll now that the [facility] Closed.  Awaiting return call.    Nallely Buenrostro RN-[facility]   [phone]        [facility] Care Coordination Contact      [facility] Refusal Telephone Assessment    Member refused home visit HRA on 1/18/2021  - NOT INTERESTED    ER visits: No  Hospitalizations: No  Health concerns: None  Falls/Injuries: No  ADL/IADL Dependencies: None        Member currently receiving the following home care services:   None  Member currently receiving the following community resources:  None  Informal support(s):  Family  Advanced Care Planning discussion, complete code section. Declined.    [facility] Health Plan sponsored benefits: Shared information re: Silver Sneakers/gym memberships, ASA, Calcium +D.    Follow-Up Plan: Member informed of future contact, plan to f/u with member with a 6 month telephone assessment and offer a home visit.  Contact information shared with member and family, encouraged member to call with any questions or concerns at any time.    Member was not interested in  completing a home visit.  She stated she is fine and needs nothing right now. She was driving during call so cc kept it short. She stated she will contact cc with any needs.    Nallely Buenrostro RN-[facility]   [phone]

## 2021-01-25 DIAGNOSIS — G25.81 RESTLESS LEG SYNDROME: ICD-10-CM

## 2021-01-25 RX ORDER — ROPINIROLE 0.5 MG/1
0.5 TABLET, FILM COATED ORAL 3 TIMES DAILY
Qty: 90 TABLET | Refills: 0 | Status: SHIPPED | OUTPATIENT
Start: 2021-01-25 | End: 2021-02-09

## 2021-01-25 NOTE — TELEPHONE ENCOUNTER
Routing refill request to provider for review/approval because:  Labs not current:  CBC  No current BP on file    Nola Wyatt RN

## 2021-02-09 DIAGNOSIS — G25.81 RESTLESS LEG SYNDROME: ICD-10-CM

## 2021-02-09 RX ORDER — ROPINIROLE 0.5 MG/1
0.5 TABLET, FILM COATED ORAL 3 TIMES DAILY
Qty: 90 TABLET | Refills: 0 | Status: SHIPPED | OUTPATIENT
Start: 2021-02-09 | End: 2021-04-06

## 2021-03-30 ENCOUNTER — ALLIED HEALTH/NURSE VISIT (OUTPATIENT)
Dept: AUDIOLOGY | Facility: CLINIC | Age: 66
End: 2021-03-30
Payer: COMMERCIAL

## 2021-03-30 DIAGNOSIS — H90.A31 MIXED CONDUCTIVE AND SENSORINEURAL HEARING LOSS OF RIGHT EAR WITH RESTRICTED HEARING OF LEFT EAR: Primary | ICD-10-CM

## 2021-03-30 DIAGNOSIS — H90.3 SENSORINEURAL HEARING LOSS, BILATERAL: ICD-10-CM

## 2021-03-30 PROCEDURE — 92592 PR HEARING AID CHECK, MONAURAL: CPT | Mod: RT | Performed by: AUDIOLOGIST

## 2021-03-30 PROCEDURE — V5266 BATTERY FOR HEARING DEVICE: HCPCS | Performed by: AUDIOLOGIST

## 2021-03-30 PROCEDURE — 99207 PR NO CHARGE LOS: CPT | Performed by: AUDIOLOGIST

## 2021-03-30 NOTE — PROGRESS NOTES
Hearing Aid Check     SUBJECTIVE:  Sally Ware is a 65 year old year old female, seen today for a hearing aid check at [facility]. Patient reports the right hearing aid is not functioning after cleaning.  Previous results have revealed a unilateral severe to mild to normal to mild to severe mixed hearing loss.  Sally was fit with a right Oticon OPN 2 mini-RITE-T hearing aid 1/3/2020.     OBJECTIVE:  Visual inspection revealed two wax filters were in the earmold and .  Removed the wax filter from the tip of the earmold, removed the  from the earmold then the filter from the .  Vacuumed  port and replaced filter, replaced filter in the earmold.  Vacuumed medardo ports. Biologic listening check revealed appropriate function.  The earmold filter was moderately soiled.    Sally requested replacement size 312 batteries for use in her hearing aid dispensed 24 batteries.     PLAN:   Provided replacement wax filters.  Return to clinic in six months for hearing aid maintenance and programming.     Ruthie Perrin.  [place] Licensed Audiologist #[id number]

## 2021-04-03 DIAGNOSIS — G25.81 RESTLESS LEG SYNDROME: ICD-10-CM

## 2021-04-06 RX ORDER — ROPINIROLE 0.5 MG/1
0.5 TABLET, FILM COATED ORAL 3 TIMES DAILY
Qty: 90 TABLET | Refills: 0 | Status: SHIPPED | OUTPATIENT
Start: 2021-04-06 | End: 2021-04-19

## 2021-04-06 NOTE — TELEPHONE ENCOUNTER
Patient is scheduled on 4/19 at 11am.    She is almost out of her medication and will still need a refill before her visit.     Dora Varela CMA ([facility]) 4/6/2021

## 2021-04-06 NOTE — TELEPHONE ENCOUNTER
Routing to team to set up virtual appointment for patient for follow up.    Routing refill request to provider for review/approval because:  Labs not current:  CBC (2015), BP (1029)  Last Written Prescription Date:  2/9/21  Last Fill Quantity: 90,  # refills: 0   Last office visit: Visit date not found with prescribing provider:  12/14/2020 - virtual   Future Office Visit:      KATLYN HarmonN, RN

## 2021-04-19 ENCOUNTER — VIRTUAL VISIT (OUTPATIENT)
Dept: INTERNAL MEDICINE | Facility: CLINIC | Age: 66
End: 2021-04-19
Payer: COMMERCIAL

## 2021-04-19 DIAGNOSIS — G25.81 RESTLESS LEG SYNDROME: ICD-10-CM

## 2021-04-19 DIAGNOSIS — I10 ESSENTIAL HYPERTENSION: Primary | ICD-10-CM

## 2021-04-19 PROCEDURE — 99214 OFFICE O/P EST MOD 30 MIN: CPT | Mod: 95 | Performed by: INTERNAL MEDICINE

## 2021-04-19 RX ORDER — ROPINIROLE 0.5 MG/1
0.5 TABLET, FILM COATED ORAL 3 TIMES DAILY
Qty: 90 TABLET | Refills: 1 | Status: SHIPPED | OUTPATIENT
Start: 2021-04-19 | End: 2021-06-28

## 2021-04-19 NOTE — PROGRESS NOTES
Sally is a 65 year old who is being evaluated via a billable telephone visit.      What phone number would you like to be contacted at? [phone]  How would you like to obtain your AVS? Bonnyalejo Zavala is a 65 year old who presents for the following health issues     HPI     Medication Followup of Requip    Taking Medication as prescribed: yes    Side Effects:  None    Medication Helping Symptoms:  yes        EMR reviewed including:             Complaint, History of Chief Complaint, Corresponding Review of Systems, and Complaint Specific Physical Examination.    #1   Follow-up on restless leg syndrome.  Did not tolerate Sinemet.  Has remarkable improvement with Requip.  Sleeping well, no agitation, no side effects from medication.      #2   Follow-up on hypertension.  Checks blood pressure occasionally.  Generally well controlled.  Denies any headache, shortness of breath, cough or side effects from medication.                                        No physical examination is performed as this is a virtual visit.  The patient's voice is strong, there is no evidence of labored breathing or wheezing.  The patient is alert and appropriate and demonstrates no obvious behavioral irregularities.              Vital Signs:   LMP  (LMP Unknown)              Decision Making    Problem and Complexity     1. Restless leg syndrome  Continue Requip.  - rOPINIRole (REQUIP) 0.5 MG tablet; Take 1 tablet (0.5 mg) by mouth 3 times daily .  Dispense: 90 tablet; Refill: 1    2. Essential hypertension  Continue lisinopril.            ------------------------------------------------------------------------------------------------------------------------------  Data    4=1/3 5=2/3    1  >3  Specialty (external) notes reviewed:   None  Individual tests ordered (by provider) reviewed:   None  Individual tests ordered (by provider):   None  Independent Historians Interview:   None  2  Review of outside (other  "providers) Tests:   None  3   Verbal Discussion with Specialists:    None    ----------------------------------------------------------------------------------------------------------------------------------  Risk   Prescription drug management:   Yes                                High risk for toxicity:   Decision regarding surgery:    None   Social determinants of health:   No   Decision regarding hospitalization:     None   Decision to withhold therapy:   None                               FOLLOW UP   I have asked the patient to make an appointment for followup with me late summer early fall for \"Annual Medicare Wellness Visit\"            I have carefully explained the diagnosis and treatment options to the patient.  The patient has displayed an understanding of the above, and all subsequent questions were answered.      DO RAYMOND Stewart    Portions of this note were produced using [employer]  Although every attempt at real-time proof reading has been made, occasional grammar/syntax errors may have been missed.      Telephone time: 12 minutes          "

## 2021-05-05 DIAGNOSIS — G25.81 RESTLESS LEG SYNDROME: ICD-10-CM

## 2021-05-05 RX ORDER — ROPINIROLE 0.5 MG/1
TABLET, FILM COATED ORAL
Qty: 90 TABLET | Refills: 1 | OUTPATIENT
Start: 2021-05-05

## 2021-05-05 NOTE — TELEPHONE ENCOUNTER
Prescription was sent 4/19/2021 for #90 with 1 refill.  Pharmacy notified via E-Prescribe refusal.     KATLYN GarN, RN  [facility]

## 2021-06-24 DIAGNOSIS — G25.81 RESTLESS LEG SYNDROME: ICD-10-CM

## 2021-06-28 RX ORDER — ROPINIROLE 0.5 MG/1
0.5 TABLET, FILM COATED ORAL 3 TIMES DAILY
Qty: 90 TABLET | Refills: 1 | Status: SHIPPED | OUTPATIENT
Start: 2021-06-28 | End: 2021-07-08

## 2021-06-28 NOTE — TELEPHONE ENCOUNTER
REQUIP Prescription approved per [facility] Refill Protocol.  Due for appt in August for cont med use. ........JULIA Castillo

## 2021-07-08 ENCOUNTER — OFFICE VISIT (OUTPATIENT)
Dept: INTERNAL MEDICINE | Facility: CLINIC | Age: 66
End: 2021-07-08
Payer: COMMERCIAL

## 2021-07-08 VITALS
DIASTOLIC BLOOD PRESSURE: 74 MMHG | OXYGEN SATURATION: 96 % | BODY MASS INDEX: 21.77 KG/M2 | SYSTOLIC BLOOD PRESSURE: 136 MMHG | TEMPERATURE: 97.2 F | RESPIRATION RATE: 18 BRPM | WEIGHT: 119 LBS | HEART RATE: 104 BPM

## 2021-07-08 DIAGNOSIS — G25.81 RESTLESS LEG SYNDROME: ICD-10-CM

## 2021-07-08 DIAGNOSIS — Z12.11 SCREEN FOR COLON CANCER: Primary | ICD-10-CM

## 2021-07-08 DIAGNOSIS — I10 ESSENTIAL HYPERTENSION: ICD-10-CM

## 2021-07-08 PROCEDURE — 99214 OFFICE O/P EST MOD 30 MIN: CPT | Performed by: INTERNAL MEDICINE

## 2021-07-08 RX ORDER — LISINOPRIL 10 MG/1
10 TABLET ORAL DAILY
Qty: 90 TABLET | Refills: 3 | Status: SHIPPED | OUTPATIENT
Start: 2021-07-08 | End: 2022-06-01

## 2021-07-08 RX ORDER — ROPINIROLE 0.5 MG/1
0.5 TABLET, FILM COATED ORAL 3 TIMES DAILY
Qty: 90 TABLET | Refills: 1 | Status: SHIPPED | OUTPATIENT
Start: 2021-07-08 | End: 2021-09-15

## 2021-07-08 ASSESSMENT — PAIN SCALES - GENERAL: PAINLEVEL: MILD PAIN (2)

## 2021-07-08 NOTE — PROGRESS NOTES
Tod Zavala is a 65 year old who presents for the following health issues     HPI     Hypertension Follow-up      Do you check your blood pressure regularly outside of the clinic? No     Are you following a low salt diet? Yes    Are your blood pressures ever more than 140 on the top number (systolic) OR more   than 90 on the bottom number (diastolic), for example 140/90? No       EMR reviewed including:             Complaint, History of Chief Complaint, Corresponding Review of Systems, and Complaint Specific Physical Examination.    #1   Follow-up hypertension.  Tolerating lisinopril without cough or side effect.  Blood pressure well controlled.  Denies headaches, chest pain, neurologic sequelae.       Exam:   LUNGS: clear bilaterally, airflow is brisk, no intercostal retraction or stridor is noted. No coughing is noted during visit.   HEART:  regular without rubs, clicks, gallops, or murmurs. PMI is nondisplaced. Upstrokes are brisk. S1,S2 are heard.      #2   Restless leg syndrome.  Good results with Requip.  Generally forgets noon dose.  Restless leg syndrome controlled at bedtime.       Exam:   NEURO: Pt is alert and appropriate. No neurologic lateralization is noted. Cranial nerves 2-12 are intact. Peripheral sensory and motor function are grossly normal.       #3   Due for colonoscopy.  History of adenomatous polyps.  No recent melena or hematochezia noted.       Exam:   GI: Abdomen is soft, without rebound, guarding or tenderness. Bowel sounds are appropriate. No renal bruits are heard.        Vital Signs:   /74 (BP Location: Right arm, Patient Position: Sitting, Cuff Size: Adult Regular)   Pulse 104   Temp 97.2  F (36.2  C) (Temporal)   Resp 18   Wt 54 kg (119 lb)   LMP  (LMP Unknown)   SpO2 96%   BMI 21.77 kg/m               Decision Making    Problem and Complexity     1. Restless leg syndrome  Patient may continue to use up to 3 times daily.  Would recommend trial at  bedtime only.  - rOPINIRole (REQUIP) 0.5 MG tablet; Take 1 tablet (0.5 mg) by mouth 3 times daily  Dispense: 90 tablet; Refill: 1    2. Essential hypertension  Laboratory work from December reviewed and all quite unremarkable.  We will hold off on lab work until the fall.  - lisinopril (ZESTRIL) 10 MG tablet; Take 1 tablet (10 mg) by mouth daily  Dispense: 90 tablet; Refill: 3    3. Screen for colon cancer  Discussed need for 5 consent colonoscopy.  We will schedule  - GASTROENTEROLOGY ADULT REF PROCEDURE ONLY; Future          ------------------------------------------------------------------------------------------------------------------------------  Data    4=1/3 5=2/3    1  >3  Specialty (external) notes reviewed: None pathology  Individual tests ordered (by provider) reviewed:   As above  Individual tests ordered (by provider):   Colonoscopy  Independent Historians Interview:   None  2  Review of outside (other providers) Tests:   Colonoscopy reviewed  3   Verbal Discussion with Specialists:    None    ----------------------------------------------------------------------------------------------------------------------------------  Risk   Prescription drug management:   Ongoing                                High risk for toxicity:   Decision regarding surgery:    None   Social determinants of health:   No   Decision regarding hospitalization:     None   Decision to withhold therapy:   None                                 FOLLOW UP   I have asked the patient to make an appointment for followup with me following the colonoscopy            I have carefully explained the diagnosis and treatment options to the patient.  The patient has displayed an understanding of the above, and all subsequent questions were answered.      DO RAYMOND Stewart    Portions of this note were produced using [employer]  Although every attempt at real-time proof reading has been made, occasional grammar/syntax errors may  have been missed.

## 2021-07-20 ENCOUNTER — PATIENT OUTREACH (OUTPATIENT)
Dept: OTHER | Age: 66
End: 2021-07-20

## 2021-07-20 NOTE — LETTER
July 27, 2021      SALLY FISHER  [address]  [place] [address]        Dear Sally:     Your Care Coordinator has been unable to reach you by telephone. I am writing to ask you or an authorized representative to call me at [phone]. If you reach my voicemail, please leave a message with your daytime telephone number and a date and time that I can call you. If you are hearing impaired, please call the [place] Relay at [phone] or [phone] (rsqsvt-bz-ipyfrx relay service).     The reason I am trying to reach you is:    [x] Six (6) month check-in  [] To schedule your annual assessment  [] Other:      Please call me as soon as you receive this letter. I look forward to speaking with you.    Sincerely,    Nallely Buenrostro RN-BC    E-mail: [email or web address]  Phone: [phone]      [facility] (\[facility] [place] [facility]\"") is a health plan that contracts with both Medicare and the [place] Medical Assistance (Medicaid) program to provide benefits of both programs to enrollees. Enrollment in [facility] depends on contract renewal.    [facility]+[id number] DHS APPROVED ([id number])  [id number] (02/19)

## 2021-07-22 ENCOUNTER — TELEPHONE (OUTPATIENT)
Dept: AUDIOLOGY | Facility: CLINIC | Age: 66
End: 2021-07-22

## 2021-07-22 NOTE — TELEPHONE ENCOUNTER
Reason for Call:  Other appointment    Detailed comments: Patient calling because she would like to get another case that holds her hearing aids. Her previous one broke in her suitcase. Please call her.     Phone Number Patient can be reached at: Home number on file [phone] (home)    Best Time: any    Can we leave a detailed message on this number? YES    Call taken on 7/22/2021 at 11:11 AM by Maye Lundy

## 2021-07-22 NOTE — TELEPHONE ENCOUNTER
Left message for patient, sent replacement cases to home address. Tiffany Montesinos Anxiety    Asthma    HTN (hypertension)

## 2021-07-23 NOTE — PROGRESS NOTES
[facility] Care Coordination Contact    7/19/21  Attempted to reach member for 6 month call. Unable to leave a VM. Will try again.    Nallely Buenrostro RN-[facility]   [phone]      7/20/21  Attempted to reach member for 6 month call. Unable to leave a VM. Will try again.    SUKUMAR Sears[facility]   [phone]    7/26/2021  Attempted 3rd contact. CMS to mail [facility] letter.  Nallely Buenrostro RN-[facility]   [phone]

## 2021-07-27 NOTE — PROGRESS NOTES
"[facility] Care Coordination Contact    Per CC, mailed client an \"Unable to Contact\" letter.    Rakel Soria  Care Management Specialist   [facility]   [phone]    "

## 2021-08-10 ENCOUNTER — TELEPHONE (OUTPATIENT)
Dept: INTERNAL MEDICINE | Facility: CLINIC | Age: 66
End: 2021-08-10

## 2021-08-10 NOTE — TELEPHONE ENCOUNTER
Left message for patient to return call to schedule EGD/colonoscopy. If Peggy or Liset are not available, please transfer to same day surgery        x2 letter sent  1st call made 7/27

## 2021-09-28 ENCOUNTER — OFFICE VISIT (OUTPATIENT)
Dept: AUDIOLOGY | Facility: CLINIC | Age: 66
End: 2021-09-28
Payer: COMMERCIAL

## 2021-09-28 DIAGNOSIS — H90.A31 MIXED CONDUCTIVE AND SENSORINEURAL HEARING LOSS OF RIGHT EAR WITH RESTRICTED HEARING OF LEFT EAR: Primary | ICD-10-CM

## 2021-09-28 PROCEDURE — 99207 PR NO CHARGE LOS: CPT | Performed by: AUDIOLOGIST

## 2021-09-28 PROCEDURE — 92592 PR HEARING AID CHECK, MONAURAL: CPT | Mod: RT | Performed by: AUDIOLOGIST

## 2021-09-28 PROCEDURE — V5266 BATTERY FOR HEARING DEVICE: HCPCS | Performed by: AUDIOLOGIST

## 2021-10-03 ENCOUNTER — HEALTH MAINTENANCE LETTER (OUTPATIENT)
Age: 66
End: 2021-10-03

## 2021-11-05 ENCOUNTER — HOSPITAL ENCOUNTER (OUTPATIENT)
Dept: MAMMOGRAPHY | Facility: CLINIC | Age: 66
Discharge: HOME | End: 2021-11-05
Attending: INTERNAL MEDICINE | Admitting: INTERNAL MEDICINE
Payer: COMMERCIAL

## 2021-11-05 DIAGNOSIS — Z12.31 VISIT FOR SCREENING MAMMOGRAM: ICD-10-CM

## 2021-11-05 PROCEDURE — 77063 BREAST TOMOSYNTHESIS BI: CPT

## 2021-12-06 ENCOUNTER — PATIENT OUTREACH (OUTPATIENT)
Dept: OTHER | Age: 66
End: 2021-12-06

## 2021-12-06 NOTE — PROGRESS NOTES
[facility] Care Coordination Contact    cc attempted to reach member to schedule yearly visit as she has a hx of RoV. Will try again next week.    Nallely Buenrostro RN-[facility]   [phone]    12/13/2021   cc attempted to reach member to schedule yearly visit as she has a hx of RoV.  Left requesting a return call.    [facility]   [phone]    12/14/21    cc attempted to reach member to schedule yearly visit as she has a hx of RoV.  Left requesting a return call. CMS to mail [facility] letter.    VIGNESH Sears  [facility]   [phone]

## 2021-12-06 NOTE — LETTER
December 14, 2021      SALLY FISHER  [address]  [place] [address]        Dear Sally:     Your Care Coordinator has been unable to reach you by telephone. I am writing to ask you or an authorized representative to call me at [phone]. If you reach my voicemail, please leave a message with your daytime telephone number and a date and time that I can call you. If you are hearing impaired, please call the [place] Relay at [phone] or [phone] (icjzur-hd-vonbsc relay service).     The reason I am trying to reach you is:    [] Six (6) month check-in  [x] To schedule your annual assessment  [] Other:      Please call me as soon as you receive this letter. I look forward to speaking with you.    Sincerely,    Nallely Buenrostro RN-BC    E-mail: [email or web address]  Phone: [phone]      [facility] is a health plan that contracts with both Medicare and the [place] Medical Assistance (Medicaid) program to provide benefits of both programs to enrollees. Enrollment in [facility] depends on contract renewal.    [id number] DHS APPROVED ([id number])  [id number] (02/19)

## 2021-12-14 NOTE — PROGRESS NOTES
"[facility] Care Coordination Contact    Per CC, mailed client an \"Unable to Contact\" letter.    Rakel Soria  Care Management Specialist   [facility]   [phone]    "

## 2021-12-30 ENCOUNTER — PATIENT OUTREACH (OUTPATIENT)
Dept: OTHER | Age: 66
End: 2021-12-30

## 2021-12-30 NOTE — PROGRESS NOTES
[facility] Care Coordination Contact      [facility] Refusal Telephone Assessment    Member refused home visit HRA on 12/30/21  - Not interested. No services needed.     ER visits: No  Hospitalizations: No  Health concerns: None  Falls/Injuries: No  ADL/IADL Dependencies: None        Member currently receiving the following home care services:   None  Member currently receiving the following community resources: None  Informal support(s): Family, Friends.      Advanced Care Planning discussion, complete code section.    [facility] Plan sponsored benefits: Shared information re: Silver Sneakers/gym memberships, ASA, Calcium +D.    Follow-Up Plan: Member informed of future contact, plan to f/u with member with a 6 month telephone assessment and offer a home visit.  Contact information shared with member and family, encouraged member to call with any questions or concerns at any time.    Nallely Buenrostro RN-[facility]   [phone]

## 2022-01-23 ENCOUNTER — HEALTH MAINTENANCE LETTER (OUTPATIENT)
Age: 67
End: 2022-01-23

## 2022-03-29 ENCOUNTER — OFFICE VISIT (OUTPATIENT)
Dept: AUDIOLOGY | Facility: CLINIC | Age: 67
End: 2022-03-29
Payer: COMMERCIAL

## 2022-03-29 DIAGNOSIS — H90.A31 MIXED CONDUCTIVE AND SENSORINEURAL HEARING LOSS OF RIGHT EAR WITH RESTRICTED HEARING OF LEFT EAR: Primary | ICD-10-CM

## 2022-03-29 DIAGNOSIS — H90.3 SENSORINEURAL HEARING LOSS, BILATERAL: ICD-10-CM

## 2022-03-29 PROCEDURE — 99207 PR NO CHARGE LOS: CPT | Performed by: AUDIOLOGIST

## 2022-03-29 PROCEDURE — 92592 PR HEARING AID CHECK, MONAURAL: CPT | Mod: RT | Performed by: AUDIOLOGIST

## 2022-03-29 NOTE — PROGRESS NOTES
AUDIOLOGY REPORT - Hearing Aid Check     SUBJECTIVE:  Sally Ware is a 66 year old year old female, seen today for a hearing aid check at [facility]. Patient reports the right hearing aid is functioning appropriately and she is here for maintenance.      Previous results have revealed a unilateral severe to mild to normal to mild to severe mixed hearing loss.  Sally was fit with a right Oticon OPN 2 mini-RITE-T hearing aid 1/3/2020.     OBJECTIVE:  Removed wax filter in the earmold, vacuumed secondary filter in the  (did not remove from the earmold). Vacuumed medardo ports. Biologic listening check revealed appropriate function.  The earmold filter was moderately soiled.     PLAN:   Return to clinic in six months for hearing aid maintenance and programming, sooner if there are problems. Advised of the hearing aid drop off service if she is not able to get an appointment and needs hearing aid maintenance urgently.     Ruthie Perrin.  [place] Licensed Audiologist #[id number]

## 2022-04-07 ENCOUNTER — OFFICE VISIT (OUTPATIENT)
Dept: FAMILY MEDICINE | Facility: CLINIC | Age: 67
End: 2022-04-07
Payer: COMMERCIAL

## 2022-04-07 VITALS
WEIGHT: 117 LBS | DIASTOLIC BLOOD PRESSURE: 82 MMHG | TEMPERATURE: 97.9 F | RESPIRATION RATE: 18 BRPM | OXYGEN SATURATION: 100 % | SYSTOLIC BLOOD PRESSURE: 138 MMHG | BODY MASS INDEX: 21.4 KG/M2 | HEART RATE: 112 BPM

## 2022-04-07 DIAGNOSIS — R21 RASH AND NONSPECIFIC SKIN ERUPTION: Primary | ICD-10-CM

## 2022-04-07 PROCEDURE — 99213 OFFICE O/P EST LOW 20 MIN: CPT | Performed by: FAMILY MEDICINE

## 2022-04-07 RX ORDER — BETAMETHASONE DIPROPIONATE 0.5 MG/G
CREAM TOPICAL 2 TIMES DAILY
Qty: 50 G | Refills: 0 | Status: SHIPPED | OUTPATIENT
Start: 2022-04-07

## 2022-04-07 RX ORDER — METHYLPREDNISOLONE 4 MG
TABLET, DOSE PACK ORAL
Qty: 21 TABLET | Refills: 0 | Status: SHIPPED | OUTPATIENT
Start: 2022-04-07

## 2022-04-07 RX ORDER — HYDROXYZINE HYDROCHLORIDE 25 MG/1
25 TABLET, FILM COATED ORAL 3 TIMES DAILY PRN
Qty: 30 TABLET | Refills: 0 | Status: SHIPPED | OUTPATIENT
Start: 2022-04-07

## 2022-04-07 ASSESSMENT — PAIN SCALES - GENERAL: PAINLEVEL: NO PAIN (0)

## 2022-04-07 NOTE — PROGRESS NOTES
Assessment & Plan     Rash and nonspecific skin eruption  Macular rash on both sides of her back around the shoulder blades and around her front just below her breasts.  No vesicles.  Pustules.  Pretty much confluent.  Looks like an allergic reaction.  We will go with the medications below and follow-up if not improving.  - augmented betamethasone dipropionate (DIPROLENE-AF) 0.05 % external cream; Apply topically 2 times daily  - hydrOXYzine (ATARAX) 25 MG tablet; Take 1 tablet (25 mg) by mouth 3 times daily as needed for itching  - methylPREDNISolone (MEDROL DOSEPAK) 4 MG tablet therapy pack; Follow Package Directions                 No follow-ups on file.    Bart Cruz MD  [facility]    Tod Zavala is a 66 year old who presents for the following health issues : Rash as noted below is actually been there about 10 days.  Not really spreading.  Not painful.  No known contacts.    History of Present Illness       Reason for visit:  Rash  Symptom onset:  3-7 days ago  Symptoms include:  Itching, painful  Symptom intensity:  Severe  Symptom progression:  Staying the same  Had these symptoms before:  No  What makes it worse:  Scratching, showering  What makes it better:  No    She eats 2-3 servings of fruits and vegetables daily.She consumes 0 sweetened beverage(s) daily.She exercises with enough effort to increase her heart rate 20 to 29 minutes per day.  She exercises with enough effort to increase her heart rate 4 days per week.   She is taking medications regularly.             Review of Systems   Constitutional, HEENT, cardiovascular, pulmonary, gi and gu systems are negative, except as otherwise noted.      Objective    /82 (BP Location: Right arm, Patient Position: Sitting, Cuff Size: Adult Large)   Pulse 112   Temp 97.9  F (36.6  C) (Temporal)   Resp 18   Wt 53.1 kg (117 lb)   LMP  (LMP Unknown)   SpO2 100%   BMI 21.40 kg/m    Body mass index is 21.4  kg/m .  Physical Exam   GENERAL: healthy, alert and no distress  NECK: no adenopathy, no asymmetry, masses, or scars and thyroid normal to palpation  RESP: lungs clear to auscultation - no rales, rhonchi or wheezes  MS: no gross musculoskeletal defects noted, no edema  SKIN: Faded red macular rash confluent and patchy areas quite large below her left breast swinging around to her back on the left side also on the right side no around her shoulder blade no vesicles no pustules not warm.  NEURO: Normal strength and tone, mentation intact and speech normal  PSYCH: mentation appears normal, affect normal/bright

## 2022-05-30 DIAGNOSIS — I10 ESSENTIAL HYPERTENSION: ICD-10-CM

## 2022-06-01 RX ORDER — LISINOPRIL 10 MG/1
10 TABLET ORAL DAILY
Qty: 90 TABLET | Refills: 0 | Status: SHIPPED | OUTPATIENT
Start: 2022-06-01

## 2022-06-01 NOTE — TELEPHONE ENCOUNTER
Routing refill request to provider for review/approval because:  Labs not current:  Creatinine, Potassium    HOLLIE Gar, RN  [facility]

## 2022-07-18 ENCOUNTER — PATIENT OUTREACH (OUTPATIENT)
Dept: GERIATRIC MEDICINE | Facility: CLINIC | Age: 67
End: 2022-07-18

## 2022-07-18 NOTE — PROGRESS NOTES
[facility] Care Coordination Contact    Called member to complete six month assessment and left a message requesting a return call.    Kala Bowman RN  [facility]  [phone]

## 2022-07-19 NOTE — PROGRESS NOTES
[facility] Care Coordination Contact      [facility] Six-Month Telephone Assessment    6 month telephone assessment completed on 7/19/22.    ER visits: No  Hospitalizations: No  TCU stays: No  Significant health status changes: no concerns   Falls/Injuries: No  ADL/IADL changes: No  Changes in services: No    Caregiver Assessment follow up:  n/a    Goals: See POC in chart for goal progress documentation.      Will see member in 6 months for an annual health risk assessment.   Encouraged member to call CC with any questions or concerns in the meantime.     Member relocated to the following address:  [address] [place], [address]00 form completed and CMS updated.     Kala Bowman RN  [facility]  [phone]

## 2022-07-25 ENCOUNTER — PATIENT OUTREACH (OUTPATIENT)
Dept: GERIATRIC MEDICINE | Facility: CLINIC | Age: 67
End: 2022-07-25

## 2022-07-25 NOTE — PROGRESS NOTES
[facility] Care Coordination Contact    Internal CC change effective 8/1/22.  Mailed member CC Change letter.  Additional tasks to be completed by CMS include: update database & EPIC, enter CC Change in MMIS, and move member files on Q drive.    Diandra Wray  Case Management Specialist  [facility]  [phone]

## 2022-07-25 NOTE — LETTER
July 25, 2022    SLALY FISHER   [address]  [place] [address]      Dear Sally:    As a member of [facility] ([facility] (\[facility] [place] [facility]\""), you are provided a care coordinator. I will be your new care coordinator as of 8/1/22. I will be calling you soon to see how you are doing and determine your needs.    If you have any questions, please feel free to call me at [phone]. If you reach my voice mail, please leave a message and your phone number. If you are hearing impaired, please call the [place] Relay at [phone] or [phone] (fymfcz-cv-fvltep relay service).    I look forward to speaking with you soon.    Sincerely,    Mary Alice Padgett RN  [phone]  [email or web address] is a health plan that contracts with both Medicare and the [place] Medical Assistance (Medicaid) program to provide benefits of both programs to enrollees. Enrollment in [facility] depends on contract renewal.      [facility]  [id number] DHS Approved ([id number])  [id number] (11/18)

## 2022-08-19 ENCOUNTER — PATIENT OUTREACH (OUTPATIENT)
Dept: GERIATRIC MEDICINE | Facility: CLINIC | Age: 67
End: 2022-08-19

## 2022-09-01 ENCOUNTER — TELEPHONE (OUTPATIENT)
Dept: AUDIOLOGY | Facility: CLINIC | Age: 67
End: 2022-09-01
Payer: COMMERCIAL

## 2022-09-01 DIAGNOSIS — H90.A31 MIXED CONDUCTIVE AND SENSORINEURAL HEARING LOSS OF RIGHT EAR WITH RESTRICTED HEARING OF LEFT EAR: ICD-10-CM

## 2022-09-01 DIAGNOSIS — H90.A22 SENSORINEURAL HEARING LOSS (SNHL) OF LEFT EAR WITH RESTRICTED HEARING OF RIGHT EAR: Primary | ICD-10-CM

## 2022-09-01 PROCEDURE — V5266 BATTERY FOR HEARING DEVICE: HCPCS | Performed by: AUDIOLOGIST

## 2022-09-01 NOTE — TELEPHONE ENCOUNTER
Dispensed 24 size 312 hearing aid batteries, which were mailed to the address on file.    CHARGES  [id number] Hearing aid battery x24 ($1 each, total $24)    Unique Trevizo, CCC-A  [place] Licensed Audiologist #[id number]  9/1/2022

## 2022-09-01 NOTE — TELEPHONE ENCOUNTER
Reason for Call:  Other request    Detailed comments: Patient requesting batteries to be mailed please     Phone Number Patient can be reached at: Home number on file [phone] (home) or Cell number on file:    Telephone Information:   Mobile [phone]       Best Time: any    Can we leave a detailed message on this number? YES    Call taken on 9/1/2022 at 2:47 PM by Chastity Wallace

## 2022-09-10 ENCOUNTER — HEALTH MAINTENANCE LETTER (OUTPATIENT)
Age: 67
End: 2022-09-10

## 2022-10-26 ENCOUNTER — PATIENT OUTREACH (OUTPATIENT)
Dept: GERIATRIC MEDICINE | Facility: CLINIC | Age: 67
End: 2022-10-26

## 2022-10-26 NOTE — PROGRESS NOTES
[facility] Care Coordination Contact    No longer active with [facility] case management effective 10/31/22.  Reason for community disenrollment: [place] Term on 7/31/22. Member will term from [facility] effective 10/31/22.    Mary Alice Padgett RN  [facility]  Office:[phone]  Cell Phone: [phone]

## 2023-04-30 ENCOUNTER — HEALTH MAINTENANCE LETTER (OUTPATIENT)
Age: 68
End: 2023-04-30

## 2023-05-26 ENCOUNTER — TELEPHONE (OUTPATIENT)
Dept: AUDIOLOGY | Facility: CLINIC | Age: 68
End: 2023-05-26

## 2023-05-26 NOTE — TELEPHONE ENCOUNTER
Reason for Call:  Other Hearing aid batteries    Detailed comments: Sally is asking to have hearing aid batteries mailed to her. She said you have done this for her in the past, the front of the package has P312 on it. Sally has moved to [place] so she has new insurance, the insurance is an out of state medicare advantage plan (it's been added to her account) she isn't sure if an out of state plan will cover her batteries so she was also wondering if these batteries can be purchased at a pharmacy or other retail store?    Phone Number Patient can be reached at: Home number on file [phone] (home)    Best Time:     Can we leave a detailed message on this number? YES    Call taken on 5/26/2023 at 3:24 PM by Edyta Christianson

## 2023-06-07 NOTE — PROGRESS NOTES
CC updated program tasks and targets for [employer] launch.    Mary Alice Padgett RN  [facility]  Office:[phone]  Cell Phone: [phone]     Split-Thickness Skin Graft Text: The defect edges were debeveled with a #15 scalpel blade. Given the location of the defect, shape of the defect and the proximity to free margins a split thickness skin graft was deemed most appropriate. Using a sterile surgical marker, the primary defect shape was transferred to the donor site. The split thickness graft was then harvested.  The skin graft was then placed in the primary defect and oriented appropriately.

## 2023-06-28 NOTE — TELEPHONE ENCOUNTER
Shaun Lan- Please call Sally and let her know that Medicare does not cover batteries, uncertain of her Advantage plan. But it will be less expensive for her to get them at [employer], or [facility] than to private pay here.  Thanks, Annette

## 2024-02-18 ENCOUNTER — HEALTH MAINTENANCE LETTER (OUTPATIENT)
Age: 69
End: 2024-02-18

## 2024-07-07 ENCOUNTER — HEALTH MAINTENANCE LETTER (OUTPATIENT)
Age: 69
End: 2024-07-07

## 2024-12-02 NOTE — PROGRESS NOTES
Hearing Aid Check     SUBJECTIVE:  Sally Ware is a 64 year old year old female, [facility] Patient reports overall good function, except the  spins in the current earmold        OBJECTIVE:  Otoscopic exam indicates ears are clear of cerumen bilaterally      Replaced right earmold, was remade using scan on file.     PLAN:   Return to clinic as needed for hearing aid maintenance and programming.    Unique Perrin Licensed Audiologist #[id number]      CHARGE:     Right Earmold $80 RT modifier  
Chest pain
Lung nodule

## 2025-02-26 ENCOUNTER — LAB REQUISITION (OUTPATIENT)
Dept: LAB | Facility: CLINIC | Age: 70
End: 2025-02-26

## 2025-02-26 LAB
L PNEUMO1 AG UR QL IA: NEGATIVE
S PNEUM AG SPEC QL: NEGATIVE
SPECIMEN TYPE: NORMAL

## 2025-02-26 PROCEDURE — 87899 AGENT NOS ASSAY W/OPTIC: CPT | Performed by: FAMILY MEDICINE
